# Patient Record
Sex: MALE | Race: WHITE | NOT HISPANIC OR LATINO | Employment: OTHER | URBAN - METROPOLITAN AREA
[De-identification: names, ages, dates, MRNs, and addresses within clinical notes are randomized per-mention and may not be internally consistent; named-entity substitution may affect disease eponyms.]

---

## 2017-01-04 ENCOUNTER — HOSPITAL ENCOUNTER (OUTPATIENT)
Dept: CT IMAGING | Facility: CLINIC | Age: 57
Discharge: HOME | End: 2017-01-04
Attending: NEUROLOGICAL SURGERY | Admitting: NEUROLOGICAL SURGERY
Payer: MEDICARE

## 2017-01-04 DIAGNOSIS — G91.9 HYDROCEPHALUS (H): ICD-10-CM

## 2017-01-04 PROCEDURE — 70450 CT HEAD/BRAIN W/O DYE: CPT

## 2017-01-30 ENCOUNTER — OFFICE VISIT (OUTPATIENT)
Dept: NEUROSURGERY | Facility: CLINIC | Age: 57
End: 2017-01-30

## 2017-01-30 ENCOUNTER — OFFICE VISIT (OUTPATIENT)
Dept: NEUROLOGY | Facility: CLINIC | Age: 57
End: 2017-01-30

## 2017-01-30 VITALS
DIASTOLIC BLOOD PRESSURE: 67 MMHG | WEIGHT: 191 LBS | BODY MASS INDEX: 29.98 KG/M2 | SYSTOLIC BLOOD PRESSURE: 110 MMHG | HEART RATE: 72 BPM | HEIGHT: 67 IN

## 2017-01-30 VITALS — HEART RATE: 59 BPM | DIASTOLIC BLOOD PRESSURE: 62 MMHG | SYSTOLIC BLOOD PRESSURE: 118 MMHG | HEIGHT: 67 IN

## 2017-01-30 DIAGNOSIS — G40.909 SEIZURE DISORDER (H): Primary | ICD-10-CM

## 2017-01-30 DIAGNOSIS — G91.9 HYDROCEPHALUS, ACQUIRED (H): Primary | ICD-10-CM

## 2017-01-30 RX ORDER — ZONISAMIDE 100 MG/1
300 CAPSULE ORAL AT BEDTIME
Qty: 90 CAPSULE | Refills: 11 | Status: SHIPPED | OUTPATIENT
Start: 2017-01-30 | End: 2017-05-10

## 2017-01-30 RX ORDER — SERTRALINE HYDROCHLORIDE 100 MG/1
200 TABLET, FILM COATED ORAL
COMMUNITY
Start: 2016-11-15

## 2017-01-30 ASSESSMENT — PAIN SCALES - GENERAL: PAINLEVEL: NO PAIN (0)

## 2017-01-30 NOTE — Clinical Note
2017       RE: Garcia Canada  : 1960   MRN: [id number]      Dear Colleague,    Thank you for referring your patient, Garcia Canada, to the [facility] EPILEPSY CARE at [facility]. Please see a copy of my visit note below.    CHIEF COMPLAINT: Seizures.    HISTORY OF PRESENT ILLNESS: This patient is a 56-year-old right-handed male returns for follow up for seizures.   He had a history of right-sided meningioma, status post resection in 2014. He had an intracerebral hemorrhage over the left side of the brain.  He was previously seen by Dr. Michael Rodriguez at [facility] for his seizures.  The patient is accompanied by his friend Maribeth in the clinic today.  Patient continue to have seizures since the last visit.  His Lamictal was increaed to 300 - 400 since the last visit. His friend stated that he is having seizures once every 5-6 weeks.  Since the last visit, he had probably 2 seizures.  These seizures were described as tonic seizures, with probably LOC.  The last 2 seizures lasted for 20 sec. Each.    He was stried on Depakote recently due to increased seizure frequency.  Unfortunately, he experienced significant side effects from Depakote.  He was having significant hallucinations for the past two weeks, and Depakote had to be discontinued.    He had Botox injection for his right sided spasticity.  His spasticity improved.    According to the patient, he had a history of right-sided meningioma, status post resection in 2014. He had an intracerebral hemorrhage over the left side of the brain. He underwent a craniotomy for intraparenchymal hematoma on 2014. He had one seizure at the time of the hemorrhage. The description of the seizure is unclear.   In 2014, he had a repeat CT scan which showed dilatation of the left temporal horn related to entrapment. On 2014, Dr. Sudarshan Plummer performed an endoscopic fenestration of the cyst, and he was found to  have seizures in the hospital. The patient reported that he had a total of four seizures since 11/2014. Three were generalized tonic-clonic seizures. One was probably simple partial seizure. The simple partial seizure was right arm tonic posturing with no loss of consciousness. He remembered those and the generalized tonic-clonic seizures started with right hand tonic posturing and then progressed to generalized tonic-clonic movement. He reported that two of the seizures were witnessed. One was witnessed in the hospital and one was witnessed by his sister.     TRIGGERS FOR SEIZURES: Unclear except missing medications.   RISK FACTORS FOR SEIZURES: Left intraparenchymal hemorrhage of the brain in 01/2014. He had no history of head trauma with loss of consciousness. No history of CNS infection. No history of febrile convulsions.   PAST MEDICAL HISTORY: Cerebral intraparenchymal hemorrhage with hematoma, aneurysm basilar tip, right spastic hemiparesis, hypertension, seizures.   PAST SURGICAL HISTORY: Craniotomy with evacuation of hematoma subdural endoscopy, optical tracking system, ventriculostomy.     Current Outpatient Prescriptions   Medication Sig Dispense Refill     sertraline (ZOLOFT) 100 MG tablet Take 100 mg by mouth       zonisamide (ZONEGRAN) 100 MG capsule Take 3 capsules (300 mg) by mouth At Bedtime 90 capsule 11     lamoTRIgine (LAMICTAL) 100 MG tablet Take 3 tabs (300 mg) in the morning and 4 tabs in the evening. 630 tablet 2     AmLODIPine Besylate (NORVASC PO) Take 5 mg by mouth        carvedilol (COREG) 6.25 MG tablet Take 1 tablet (6.25 mg) by mouth 2 times daily 60 tablet 0     acetaminophen (TYLENOL) 325 MG tablet Take 2 tablets (650 mg) by mouth every 6 hours as needed for mild pain       atorvastatin (LIPITOR) 10 MG tablet Take 1 tablet (10 mg) by mouth daily          ALLERGIES: No known drug allergy.   FAMILY HISTORY: No family history of seizures.   SOCIAL HISTORY: He is single, living by  "himself. Smokes one pack every five days cigarettes. No alcohol use. No drug abuse.   REVIEW OF SYSTEMS: Right-sided weakness and spasticity. Anxiety and seizures. The rest of the 10-point review of systems is negative.     PHYSICAL EXAMINATION:   Blood pressure 110/67, pulse 72, height 5' 7.01\" (170.2 cm), weight 191 lb (86.637 kg).    HEENT: Normocephalic, atraumatic.   NECK: Supple.   EXTREMITIES: No edema.     NEUROLOGIC EXAM: Alert and oriented x3. Speech fluent, appropriate.   CRANIAL NERVES: Pupils are equal, round, reactive to light and accommodation. Extraocular movement intact. No facial weakness.  MOTOR EXAM: Normal bulk, increased tone in right upper and right lower extremities. Strength is 4/5 over the right upper and lower extremities and 5/5 over the left upper and lower extremities.   COORDINATION: Finger-to-nose, heel-to-shin examinations were normal on the left side. The patient was not able to perform over the right side.   SENSORY: Decreased sensation over the right upper and lower extremities.   GAIT: Spastic hemiparetic gait with the assistance of a cane.     PREVIOUS DIAGNOSTIC TESTING: CT of the head on 04/17/2015 showed slight interval decrease in dilated left temporal cyst and the dilated left temporal horn since the prior exam, previous left-sided craniotomy and the white matter changes in the left hemisphere again noted. MRI scan on 10/07/2014 showed trapped left lateral ventricle with cystic dilatation and adjacent vasogenic edema causing mass effect, unchanged. MRI scan on 01/02/2014 showed increased size of a hyperacute to acute intraparenchymal hemorrhage in the left posterior frontoparietal region with hemorrhage extending into an adjacent area of preexisting encephalomalacia as well as the left lateral ventricle. There is new blood extending into the right lateral ventricle occipital horn and possibly the fourth ventricle. Acute subarachnoid hemorrhage is again seen over the left " parietal and the temporal lobe and insula with possible new subarachnoid blood over the cerebellar folia. There are two regions of enhancement along the anteromedial and anterolateral hemorrhage cavity which may represent sites of residual or recurrent tumor. Comparison with prior image would likely be helpful.   EEG on of 12/01/2014 showed abnormal. There is evidence of focal cerebral dysfunction over the left hemisphere, especially the left posterior hemisphere. This is consistent with focal cerebral dysfunction in this region.   EEG on 01/03/2014 showed abnormal EEG due to the presence of mild diffuse slowing consistent with mild diffuse encephalopathy as well as additional severe slowing over the left temporal parietal head region consistent with the patient's history of subdural hematoma in this region.     Lamictal level (3/9/2016): 8.7    IMPRESSION:   1. Seizures. This patient is a 56-year-old right-handed male returns for follow up for seizures.   He had a history of right-sided meningioma, status post resection in 01/2014. He had an intracerebral hemorrhage over the left side of the brain.  He was previously seen by Dr. Michael Rodriguez at [facility] for his seizures.  The patient is accompanied by his friend Maribeth in the clinic today.  Patient continue to have seizures since the last visit.  His Lamictal was increaed to 300 - 400 since the last visit. His friend stated that he is having seizures once every 5-6 weeks.  Since the last visit, he had probably 2 seizures.  These seizures were described as tonic seizures, with probably LOC.  The last 2 seizures lasted for 20 sec. Each.    2. History of intracerebral hemorrhage status post evacuation of hematoma.   3. Status post endoscopic fenestration of the left temporal horn cyst related to entrapment.   4. Hypertension.   5. Anxiety.   6. Right spastic hemiparesis.  Improved with Botox.    PLAN:   1. Continue Lamictal 300-400 mg.  2. Start Zonegran 100mg  qhs for one week, then 200mg qhs for one week, then 300mg qhs.  3. Return to clinic in three months.  If patient continue to have seizures, options in the future are a. Increase Lamictal or Zonegran more, b. Add Vimpat.        25 min was spent on the visit.  Over 50% of the time was spent on education, counseling about optimal seizure control and coordination of care.        Again, thank you for allowing me to participate in the care of your patient.      Sincerely,    Rishi Hirsch MD

## 2017-01-30 NOTE — PROGRESS NOTES
CHIEF COMPLAINT: Seizures.    HISTORY OF PRESENT ILLNESS: This patient is a 56-year-old right-handed male returns for follow up for seizures.   He had a history of right-sided meningioma, status post resection in 01/2014. He had an intracerebral hemorrhage over the left side of the brain.  He was previously seen by Dr. Michael Rodriguez at [facility] for his seizures.  The patient is accompanied by his friend Maribeth in the clinic today.  Patient continue to have seizures since the last visit.  His Lamictal was increaed to 300 - 400 since the last visit. His friend stated that he is having seizures once every 5-6 weeks.  Since the last visit, he had probably 2 seizures.  These seizures were described as tonic seizures, with probably LOC.  The last 2 seizures lasted for 20 sec. Each.    He was stried on Depakote recently due to increased seizure frequency.  Unfortunately, he experienced significant side effects from Depakote.  He was having significant hallucinations for the past two weeks, and Depakote had to be discontinued.    He had Botox injection for his right sided spasticity.  His spasticity improved.    According to the patient, he had a history of right-sided meningioma, status post resection in 01/2014. He had an intracerebral hemorrhage over the left side of the brain. He underwent a craniotomy for intraparenchymal hematoma on 01/02/2014. He had one seizure at the time of the hemorrhage. The description of the seizure is unclear.   In 11/2014, he had a repeat CT scan which showed dilatation of the left temporal horn related to entrapment. On 11/12/2014, Dr. Sudarshan Plummer performed an endoscopic fenestration of the cyst, and he was found to have seizures in the hospital. The patient reported that he had a total of four seizures since 11/2014. Three were generalized tonic-clonic seizures. One was probably simple partial seizure. The simple partial seizure was right arm tonic posturing with no loss of  consciousness. He remembered those and the generalized tonic-clonic seizures started with right hand tonic posturing and then progressed to generalized tonic-clonic movement. He reported that two of the seizures were witnessed. One was witnessed in the hospital and one was witnessed by his sister.     TRIGGERS FOR SEIZURES: Unclear except missing medications.   RISK FACTORS FOR SEIZURES: Left intraparenchymal hemorrhage of the brain in 01/2014. He had no history of head trauma with loss of consciousness. No history of CNS infection. No history of febrile convulsions.   PAST MEDICAL HISTORY: Cerebral intraparenchymal hemorrhage with hematoma, aneurysm basilar tip, right spastic hemiparesis, hypertension, seizures.   PAST SURGICAL HISTORY: Craniotomy with evacuation of hematoma subdural endoscopy, optical tracking system, ventriculostomy.     Current Outpatient Prescriptions   Medication Sig Dispense Refill     sertraline (ZOLOFT) 100 MG tablet Take 100 mg by mouth       zonisamide (ZONEGRAN) 100 MG capsule Take 3 capsules (300 mg) by mouth At Bedtime 90 capsule 11     lamoTRIgine (LAMICTAL) 100 MG tablet Take 3 tabs (300 mg) in the morning and 4 tabs in the evening. 630 tablet 2     AmLODIPine Besylate (NORVASC PO) Take 5 mg by mouth        carvedilol (COREG) 6.25 MG tablet Take 1 tablet (6.25 mg) by mouth 2 times daily 60 tablet 0     acetaminophen (TYLENOL) 325 MG tablet Take 2 tablets (650 mg) by mouth every 6 hours as needed for mild pain       atorvastatin (LIPITOR) 10 MG tablet Take 1 tablet (10 mg) by mouth daily          ALLERGIES: No known drug allergy.   FAMILY HISTORY: No family history of seizures.   SOCIAL HISTORY: He is single, living by himself. Smokes one pack every five days cigarettes. No alcohol use. No drug abuse.   REVIEW OF SYSTEMS: Right-sided weakness and spasticity. Anxiety and seizures. The rest of the 10-point review of systems is negative.     PHYSICAL EXAMINATION:   Blood pressure 110/67,  "pulse 72, height 5' 7.01\" (170.2 cm), weight 191 lb (86.637 kg).    HEENT: Normocephalic, atraumatic.   NECK: Supple.   EXTREMITIES: No edema.     NEUROLOGIC EXAM: Alert and oriented x3. Speech fluent, appropriate.   CRANIAL NERVES: Pupils are equal, round, reactive to light and accommodation. Extraocular movement intact. No facial weakness.  MOTOR EXAM: Normal bulk, increased tone in right upper and right lower extremities. Strength is 4/5 over the right upper and lower extremities and 5/5 over the left upper and lower extremities.   COORDINATION: Finger-to-nose, heel-to-shin examinations were normal on the left side. The patient was not able to perform over the right side.   SENSORY: Decreased sensation over the right upper and lower extremities.   GAIT: Spastic hemiparetic gait with the assistance of a cane.     PREVIOUS DIAGNOSTIC TESTING: CT of the head on 04/17/2015 showed slight interval decrease in dilated left temporal cyst and the dilated left temporal horn since the prior exam, previous left-sided craniotomy and the white matter changes in the left hemisphere again noted. MRI scan on 10/07/2014 showed trapped left lateral ventricle with cystic dilatation and adjacent vasogenic edema causing mass effect, unchanged. MRI scan on 01/02/2014 showed increased size of a hyperacute to acute intraparenchymal hemorrhage in the left posterior frontoparietal region with hemorrhage extending into an adjacent area of preexisting encephalomalacia as well as the left lateral ventricle. There is new blood extending into the right lateral ventricle occipital horn and possibly the fourth ventricle. Acute subarachnoid hemorrhage is again seen over the left parietal and the temporal lobe and insula with possible new subarachnoid blood over the cerebellar folia. There are two regions of enhancement along the anteromedial and anterolateral hemorrhage cavity which may represent sites of residual or recurrent tumor. Comparison " with prior image would likely be helpful.   EEG on of 12/01/2014 showed abnormal. There is evidence of focal cerebral dysfunction over the left hemisphere, especially the left posterior hemisphere. This is consistent with focal cerebral dysfunction in this region.   EEG on 01/03/2014 showed abnormal EEG due to the presence of mild diffuse slowing consistent with mild diffuse encephalopathy as well as additional severe slowing over the left temporal parietal head region consistent with the patient's history of subdural hematoma in this region.     Lamictal level (3/9/2016): 8.7    IMPRESSION:   1. Seizures. This patient is a 56-year-old right-handed male returns for follow up for seizures.   He had a history of right-sided meningioma, status post resection in 01/2014. He had an intracerebral hemorrhage over the left side of the brain.  He was previously seen by Dr. Michael Rodriguez at [facility] for his seizures.  The patient is accompanied by his friend Maribeth in the clinic today.  Patient continue to have seizures since the last visit.  His Lamictal was increaed to 300 - 400 since the last visit. His friend stated that he is having seizures once every 5-6 weeks.  Since the last visit, he had probably 2 seizures.  These seizures were described as tonic seizures, with probably LOC.  The last 2 seizures lasted for 20 sec. Each.    2. History of intracerebral hemorrhage status post evacuation of hematoma.   3. Status post endoscopic fenestration of the left temporal horn cyst related to entrapment.   4. Hypertension.   5. Anxiety.   6. Right spastic hemiparesis.  Improved with Botox.    PLAN:   1. Continue Lamictal 300-400 mg.  2. Start Zonegran 100mg qhs for one week, then 200mg qhs for one week, then 300mg qhs.  3. Return to clinic in three months.  If patient continue to have seizures, options in the future are a. Increase Lamictal or Zonegran more, b. Add Vimpat.        25 min was spent on the visit.  Over 50% of  the time was spent on education, counseling about optimal seizure control and coordination of care.

## 2017-01-30 NOTE — Clinical Note
"1/30/2017       RE: Garcia Canada  [address]  [place] [id number]     Dear Colleague,    Thank you for referring your patient, Garcia Canada, to the [facility] NEUROSURGERY at [facility]. Please see a copy of my visit note below.     It was a pleasure to see Mr. Canada today in Neurosurgery Clinic.  He is a 54-year-old male who recently underwent an endoscopic fenestration of a trapped left temporal horn on 11/12/2014.  He returns today for routine followup.  He is doing well overall. There are some mild concerns about his word finding and memory.  Dr. Hirsch treats him for seizures. He was last seen one year ago.    Filed Vitals:    01/30/17 1103   BP: 118/62   Pulse: 59   Height: 1.702 m (5' 7\")     There is no weight on file to calculate BMI.  No Pain (0)    Awake, alert.    R hemiparesis. Stable.    CT stable from last year.     A: Arachnoid cyst s/p fenestration.    P: At this point I have recommended PRN follow up. I have explained signs and symptoms to watch for.  Sudarshan Plummer MD    "

## 2017-02-06 NOTE — PROGRESS NOTES
" It was a pleasure to see Mr. Canada today in Neurosurgery Clinic.  He is a 54-year-old male who recently underwent an endoscopic fenestration of a trapped left temporal horn on 11/12/2014.  He returns today for routine followup.  He is doing well overall. There are some mild concerns about his word finding and memory.  Dr. Hirsch treats him for seizures. He was last seen one year ago.    Filed Vitals:    01/30/17 1103   BP: 118/62   Pulse: 59   Height: 1.702 m (5' 7\")     There is no weight on file to calculate BMI.  No Pain (0)    Awake, alert.    R hemiparesis. Stable.    CT stable from last year.     A: Arachnoid cyst s/p fenestration.    P: At this point I have recommended PRN follow up. I have explained signs and symptoms to watch for.  "

## 2017-02-14 ENCOUNTER — TELEPHONE (OUTPATIENT)
Dept: NEUROLOGY | Facility: CLINIC | Age: 57
End: 2017-02-14

## 2017-02-14 NOTE — TELEPHONE ENCOUNTER
Writer received phone call from patient's POA indicating that patient was feeling ill while on Zonegran titration.  He is currently increasing it at 100 mg per week.  He has had no recent seizures.    POA is asking if he needs to continue increasing or if he can wait another week  and increase it then.      Writer advised waiting a week longer and increasing at that time.  She is agreeable with this plan and has no other questions at this time.    Jacob Valero

## 2017-04-03 ENCOUNTER — OFFICE VISIT (OUTPATIENT)
Dept: NEUROLOGY | Facility: CLINIC | Age: 57
End: 2017-04-03

## 2017-04-03 VITALS
WEIGHT: 195.6 LBS | HEIGHT: 67 IN | BODY MASS INDEX: 30.7 KG/M2 | SYSTOLIC BLOOD PRESSURE: 108 MMHG | HEART RATE: 69 BPM | DIASTOLIC BLOOD PRESSURE: 59 MMHG

## 2017-04-03 DIAGNOSIS — G40.909 SEIZURE DISORDER (H): Primary | ICD-10-CM

## 2017-04-03 NOTE — PATIENT INSTRUCTIONS
1. Continue Lamictal 300-400 mg and Zonegran 300mg qhs.  2. Check Lamictal and Zonegran levels.  3. Return to clinic in three months. If patient continue to have seizures, options in the future are a. Increase Lamictal or Zonegran more, b. Add Vimpat.

## 2017-04-03 NOTE — LETTER
4/3/2017       RE: Garcia Canada  : 1960   MRN: [id number]      Dear Colleague,    Thank you for referring your patient, Garcia Canada, to the [facility] at [facility]. Please see a copy of my visit note below.    CHIEF COMPLAINT: Seizures.    HISTORY OF PRESENT ILLNESS: This patient is a 56-year-old right-handed male returns for follow up for seizures.   He had a history of right-sided meningioma, status post resection in 2014. He had an intracerebral hemorrhage over the left side of the brain.  He was previously seen by Dr. Michael Rodriguez at [facility] for his seizures.  The patient is accompanied by his friend Maribeth in the clinic today.  Since the last visit, he had no seizures until last week.  He had one seizure after he missed one dose the night before.  He was also sleep deprived.  He had one tonic seizure/CPS with LOC.  He added Zonegran since the last visit.  He is now on Lamictal 300 - 400 and Zonegran 300 qhs.  Hie was having seizures once every 5-6 weeks before starting the Zonegran.  He had no seizures for 3 and half months after started on Zonegran.  These seizures were described as tonic seizures, with probably LOC.  The last 2 seizures lasted for 20 sec each.    He was stried on Depakote recently due to increased seizure frequency.  Unfortunately, he experienced significant side effects from Depakote.  He was having significant hallucinations for the past two weeks, and Depakote had to be discontinued.    He had Botox injection for his right sided spasticity.  His spasticity improved.    According to the patient, he had a history of right-sided meningioma, status post resection in 2014. He had an intracerebral hemorrhage over the left side of the brain. He underwent a craniotomy for intraparenchymal hematoma on 2014. He had one seizure at the time of the hemorrhage. The description of the seizure is unclear.   In 2014, he  had a repeat CT scan which showed dilatation of the left temporal horn related to entrapment. On 11/12/2014, Dr. Sudarshan Plummer performed an endoscopic fenestration of the cyst, and he was found to have seizures in the hospital. The patient reported that he had a total of four seizures since 11/2014. Three were generalized tonic-clonic seizures. One was probably simple partial seizure. The simple partial seizure was right arm tonic posturing with no loss of consciousness. He remembered those and the generalized tonic-clonic seizures started with right hand tonic posturing and then progressed to generalized tonic-clonic movement. He reported that two of the seizures were witnessed. One was witnessed in the hospital and one was witnessed by his sister.     TRIGGERS FOR SEIZURES: Unclear except missing medications.   RISK FACTORS FOR SEIZURES: Left intraparenchymal hemorrhage of the brain in 01/2014. He had no history of head trauma with loss of consciousness. No history of CNS infection. No history of febrile convulsions.   PAST MEDICAL HISTORY: Cerebral intraparenchymal hemorrhage with hematoma, aneurysm basilar tip, right spastic hemiparesis, hypertension, seizures.   PAST SURGICAL HISTORY: Craniotomy with evacuation of hematoma subdural endoscopy, optical tracking system, ventriculostomy.     Current Outpatient Prescriptions   Medication Sig Dispense Refill     sertraline (ZOLOFT) 100 MG tablet Take 100 mg by mouth       zonisamide (ZONEGRAN) 100 MG capsule Take 3 capsules (300 mg) by mouth At Bedtime 90 capsule 11     lamoTRIgine (LAMICTAL) 100 MG tablet Take 3 tabs (300 mg) in the morning and 4 tabs in the evening. 630 tablet 2     AmLODIPine Besylate (NORVASC PO) Take 5 mg by mouth        carvedilol (COREG) 6.25 MG tablet Take 1 tablet (6.25 mg) by mouth 2 times daily 60 tablet 0     acetaminophen (TYLENOL) 325 MG tablet Take 2 tablets (650 mg) by mouth every 6 hours as needed for mild pain       atorvastatin  (LIPITOR) 10 MG tablet Take 1 tablet (10 mg) by mouth daily          ALLERGIES: No known drug allergy.   FAMILY HISTORY: No family history of seizures.   SOCIAL HISTORY: He is single, living by himself. Smokes one pack every five days cigarettes. No alcohol use. No drug abuse.   REVIEW OF SYSTEMS: Right-sided weakness and spasticity. Anxiety and seizures. The rest of the 10-point review of systems is negative.     PHYSICAL EXAMINATION:   There were no vitals taken for this visit.    HEENT: Normocephalic, atraumatic.   NECK: Supple.   EXTREMITIES: No edema.     NEUROLOGIC EXAM: Alert and oriented x3. Speech fluent, appropriate.   CRANIAL NERVES: Pupils are equal, round, reactive to light and accommodation. Extraocular movement intact. No facial weakness.  MOTOR EXAM: Normal bulk, increased tone in right upper and right lower extremities. Strength is 4/5 over the right upper and lower extremities and 5/5 over the left upper and lower extremities.   COORDINATION: Finger-to-nose, heel-to-shin examinations were normal on the left side. The patient was not able to perform over the right side.   SENSORY: Decreased sensation over the right upper and lower extremities.   GAIT: Spastic hemiparetic gait with the assistance of a cane.     PREVIOUS DIAGNOSTIC TESTING: CT of the head on 04/17/2015 showed slight interval decrease in dilated left temporal cyst and the dilated left temporal horn since the prior exam, previous left-sided craniotomy and the white matter changes in the left hemisphere again noted. MRI scan on 10/07/2014 showed trapped left lateral ventricle with cystic dilatation and adjacent vasogenic edema causing mass effect, unchanged. MRI scan on 01/02/2014 showed increased size of a hyperacute to acute intraparenchymal hemorrhage in the left posterior frontoparietal region with hemorrhage extending into an adjacent area of preexisting encephalomalacia as well as the left lateral ventricle. There is new blood  extending into the right lateral ventricle occipital horn and possibly the fourth ventricle. Acute subarachnoid hemorrhage is again seen over the left parietal and the temporal lobe and insula with possible new subarachnoid blood over the cerebellar folia. There are two regions of enhancement along the anteromedial and anterolateral hemorrhage cavity which may represent sites of residual or recurrent tumor. Comparison with prior image would likely be helpful.   EEG on of 12/01/2014 showed abnormal. There is evidence of focal cerebral dysfunction over the left hemisphere, especially the left posterior hemisphere. This is consistent with focal cerebral dysfunction in this region.   EEG on 01/03/2014 showed abnormal EEG due to the presence of mild diffuse slowing consistent with mild diffuse encephalopathy as well as additional severe slowing over the left temporal parietal head region consistent with the patient's history of subdural hematoma in this region.     Lamictal level (3/9/2016): 8.7    IMPRESSION:   1. Seizures. This patient is a 56-year-old right-handed male returns for follow up for seizures.   He had a history of right-sided meningioma, status post resection in 01/2014. He had an intracerebral hemorrhage over the left side of the brain.  He was previously seen by Dr. Michael Rodriguez at [facility] for his seizures.  The patient is accompanied by his friend Maribeth in the clinic today.  Since the last visit, he had no seizures until last week.  He had one seizure after he missed one dose the night before.  He was also sleep deprived.  He had one tonic seizure/CPS with LOC.  He added Zonegran since the last visit.  He is now on Lamictal 300 - 400 and Zonegran 300 qhs.  Hie was having seizures once every 5-6 weeks before starting the Zonegran.  He had no seizures for 3 and half months after started on Zonegran.  These seizures were described as tonic seizures, with probably LOC.  The last 2 seizures lasted  for 20 sec each.    2. History of intracerebral hemorrhage status post evacuation of hematoma.   3. Status post endoscopic fenestration of the left temporal horn cyst related to entrapment.   4. Hypertension.   5. Anxiety.   6. Right spastic hemiparesis.  Improved with Botox.    PLAN:   1. Continue Lamictal 300-400 mg and Zonegran 300mg qhs.  2. Check Lamictal and Zonegran levels.  3. Return to clinic in three months.  If patient continue to have seizures, options in the future are a. Increase Lamictal or Zonegran more, b. Add Vimpat.    30 min was spent on the visit.  Over 50% of the time was spent on education, counseling about optimal seizure control and coordination of care.      Again, thank you for allowing me to participate in the care of your patient.      Sincerely,    Rishi Hirsch MD

## 2017-04-03 NOTE — MR AVS SNAPSHOT
After Visit Summary   4/3/2017    Garcia Canada    MRN: [id number]           Patient Information     Date Of Birth          1960        Visit Information        Provider Department      4/3/2017 11:00 AM Rishi Hirsch MD [facility]         Follow-ups after your visit        Your next 10 appointments already scheduled     Apr 03, 2017 11:00 AM CDT   Return Visit with MD [facility]    [address], [address]  [facility] [id number] [phone]              Who to contact     Please call your clinic at [phone] to:    Ask questions about your health    Make or cancel appointments    Discuss your medicines    Learn about your test results    Speak to your doctor   If you have compliments or concerns about an experience at your clinic, or if you wish to file a complaint, please contact [facility] Patient Relations at [phone] or email us at [email or web address]         Additional Information About Your Visit        MyChart Information     [employer] gives you secure access to your electronic health record. If you see a primary care provider, you can also send messages to your care team and make appointments. If you have questions, please call your primary care clinic.  If you do not have a primary care provider, please call [phone] and they will assist you.      [employer]it is an electronic gateway that provides easy, online access to your medical records. With [employer], you can request a clinic appointment, read your test results, renew a prescription or communicate with your care team.     To access your existing account, please contact your [facility] or call [phone] for assistance.        Care EveryWhere ID     This is your Care EveryWhere ID. This could be used by other organizations to access your [facility] medical records  [id number]          Blood Pressure from Last 3 Encounters:   01/30/17 110/67   01/30/17 118/62   10/17/16 121/65    Weight from Last 3 Encounters:   01/30/17 191 lb (86.6 kg)   10/17/16 191 lb (86.6 kg)   06/06/16 187 lb 3.2 oz (84.9 kg)              Today, you had the following     No orders found for display       Primary Care Provider Office Phone # Fax #    Steven Duane Semmler, [phone]       [facility] [address] [facility] [address]        Thank you!     Thank you for choosing [facility]  for your care. Our goal is always to provide you with excellent care. Hearing back from our patients is one way we can continue to improve our services. Please take a few minutes to complete the written survey that you may receive in the mail after your visit with us. Thank you!             Your Updated Medication List - Protect others around you: Learn how to safely use, store and throw away your medicines at www.disposemymeds.org.          This list is accurate as of: 4/3/17 10:14 AM.  Always use your most recent med list.                   Brand Name Dispense Instructions for use    acetaminophen 325 MG tablet    TYLENOL     Take 2 tablets (650 mg) by mouth every 6 hours as needed for mild pain       atorvastatin 10 MG tablet    LIPITOR     Take 1 tablet (10 mg) by mouth daily       carvedilol 6.25 MG tablet    COREG    60 tablet    Take 1 tablet (6.25 mg) by mouth 2 times daily       lamoTRIgine 100 MG tablet    LaMICtal    630 tablet    Take 3 tabs (300 mg) in the morning and 4 tabs in the evening.       NORVASC PO      Take 5 mg by mouth       sertraline 100 MG tablet    ZOLOFT     Take 100 mg by mouth       zonisamide 100 MG capsule    ZONEGRAN    90 capsule    Take 3 capsules (300 mg) by mouth At Bedtime

## 2017-04-03 NOTE — PROGRESS NOTES
CHIEF COMPLAINT: Seizures.    HISTORY OF PRESENT ILLNESS: This patient is a 56-year-old right-handed male returns for follow up for seizures.   He had a history of right-sided meningioma, status post resection in 01/2014. He had an intracerebral hemorrhage over the left side of the brain.  He was previously seen by Dr. Michael Rodriguez at [facility] for his seizures.  The patient is accompanied by his friend Maribeth in the clinic today.  Since the last visit, he had no seizures until last week.  He had one seizure after he missed one dose the night before.  He was also sleep deprived.  He had one tonic seizure/CPS with LOC.  He added Zonegran since the last visit.  He is now on Lamictal 300 - 400 and Zonegran 300 qhs.  Hie was having seizures once every 5-6 weeks before starting the Zonegran.  He had no seizures for 3 and half months after started on Zonegran.  These seizures were described as tonic seizures, with probably LOC.  The last 2 seizures lasted for 20 sec each.    He was stried on Depakote recently due to increased seizure frequency.  Unfortunately, he experienced significant side effects from Depakote.  He was having significant hallucinations for the past two weeks, and Depakote had to be discontinued.    He had Botox injection for his right sided spasticity.  His spasticity improved.    According to the patient, he had a history of right-sided meningioma, status post resection in 01/2014. He had an intracerebral hemorrhage over the left side of the brain. He underwent a craniotomy for intraparenchymal hematoma on 01/02/2014. He had one seizure at the time of the hemorrhage. The description of the seizure is unclear.   In 11/2014, he had a repeat CT scan which showed dilatation of the left temporal horn related to entrapment. On 11/12/2014, Dr. Sudarshan Plummer performed an endoscopic fenestration of the cyst, and he was found to have seizures in the hospital. The patient reported that he had a total of  four seizures since 11/2014. Three were generalized tonic-clonic seizures. One was probably simple partial seizure. The simple partial seizure was right arm tonic posturing with no loss of consciousness. He remembered those and the generalized tonic-clonic seizures started with right hand tonic posturing and then progressed to generalized tonic-clonic movement. He reported that two of the seizures were witnessed. One was witnessed in the hospital and one was witnessed by his sister.     TRIGGERS FOR SEIZURES: Unclear except missing medications.   RISK FACTORS FOR SEIZURES: Left intraparenchymal hemorrhage of the brain in 01/2014. He had no history of head trauma with loss of consciousness. No history of CNS infection. No history of febrile convulsions.   PAST MEDICAL HISTORY: Cerebral intraparenchymal hemorrhage with hematoma, aneurysm basilar tip, right spastic hemiparesis, hypertension, seizures.   PAST SURGICAL HISTORY: Craniotomy with evacuation of hematoma subdural endoscopy, optical tracking system, ventriculostomy.     Current Outpatient Prescriptions   Medication Sig Dispense Refill     sertraline (ZOLOFT) 100 MG tablet Take 100 mg by mouth       zonisamide (ZONEGRAN) 100 MG capsule Take 3 capsules (300 mg) by mouth At Bedtime 90 capsule 11     lamoTRIgine (LAMICTAL) 100 MG tablet Take 3 tabs (300 mg) in the morning and 4 tabs in the evening. 630 tablet 2     AmLODIPine Besylate (NORVASC PO) Take 5 mg by mouth        carvedilol (COREG) 6.25 MG tablet Take 1 tablet (6.25 mg) by mouth 2 times daily 60 tablet 0     acetaminophen (TYLENOL) 325 MG tablet Take 2 tablets (650 mg) by mouth every 6 hours as needed for mild pain       atorvastatin (LIPITOR) 10 MG tablet Take 1 tablet (10 mg) by mouth daily          ALLERGIES: No known drug allergy.   FAMILY HISTORY: No family history of seizures.   SOCIAL HISTORY: He is single, living by himself. Smokes one pack every five days cigarettes. No alcohol use. No drug  abuse.   REVIEW OF SYSTEMS: Right-sided weakness and spasticity. Anxiety and seizures. The rest of the 10-point review of systems is negative.     PHYSICAL EXAMINATION:   There were no vitals taken for this visit.    HEENT: Normocephalic, atraumatic.   NECK: Supple.   EXTREMITIES: No edema.     NEUROLOGIC EXAM: Alert and oriented x3. Speech fluent, appropriate.   CRANIAL NERVES: Pupils are equal, round, reactive to light and accommodation. Extraocular movement intact. No facial weakness.  MOTOR EXAM: Normal bulk, increased tone in right upper and right lower extremities. Strength is 4/5 over the right upper and lower extremities and 5/5 over the left upper and lower extremities.   COORDINATION: Finger-to-nose, heel-to-shin examinations were normal on the left side. The patient was not able to perform over the right side.   SENSORY: Decreased sensation over the right upper and lower extremities.   GAIT: Spastic hemiparetic gait with the assistance of a cane.     PREVIOUS DIAGNOSTIC TESTING: CT of the head on 04/17/2015 showed slight interval decrease in dilated left temporal cyst and the dilated left temporal horn since the prior exam, previous left-sided craniotomy and the white matter changes in the left hemisphere again noted. MRI scan on 10/07/2014 showed trapped left lateral ventricle with cystic dilatation and adjacent vasogenic edema causing mass effect, unchanged. MRI scan on 01/02/2014 showed increased size of a hyperacute to acute intraparenchymal hemorrhage in the left posterior frontoparietal region with hemorrhage extending into an adjacent area of preexisting encephalomalacia as well as the left lateral ventricle. There is new blood extending into the right lateral ventricle occipital horn and possibly the fourth ventricle. Acute subarachnoid hemorrhage is again seen over the left parietal and the temporal lobe and insula with possible new subarachnoid blood over the cerebellar folia. There are two  regions of enhancement along the anteromedial and anterolateral hemorrhage cavity which may represent sites of residual or recurrent tumor. Comparison with prior image would likely be helpful.   EEG on of 12/01/2014 showed abnormal. There is evidence of focal cerebral dysfunction over the left hemisphere, especially the left posterior hemisphere. This is consistent with focal cerebral dysfunction in this region.   EEG on 01/03/2014 showed abnormal EEG due to the presence of mild diffuse slowing consistent with mild diffuse encephalopathy as well as additional severe slowing over the left temporal parietal head region consistent with the patient's history of subdural hematoma in this region.     Lamictal level (3/9/2016): 8.7    IMPRESSION:   1. Seizures. This patient is a 56-year-old right-handed male returns for follow up for seizures.   He had a history of right-sided meningioma, status post resection in 01/2014. He had an intracerebral hemorrhage over the left side of the brain.  He was previously seen by Dr. Michael Rodriguez at [facility] for his seizures.  The patient is accompanied by his friend Maribeth in the clinic today.  Since the last visit, he had no seizures until last week.  He had one seizure after he missed one dose the night before.  He was also sleep deprived.  He had one tonic seizure/CPS with LOC.  He added Zonegran since the last visit.  He is now on Lamictal 300 - 400 and Zonegran 300 qhs.  Hie was having seizures once every 5-6 weeks before starting the Zonegran.  He had no seizures for 3 and half months after started on Zonegran.  These seizures were described as tonic seizures, with probably LOC.  The last 2 seizures lasted for 20 sec each.    2. History of intracerebral hemorrhage status post evacuation of hematoma.   3. Status post endoscopic fenestration of the left temporal horn cyst related to entrapment.   4. Hypertension.   5. Anxiety.   6. Right spastic hemiparesis.  Improved with  Botox.    PLAN:   1. Continue Lamictal 300-400 mg and Zonegran 300mg qhs.  2. Check Lamictal and Zonegran levels.  3. Return to clinic in three months.  If patient continue to have seizures, options in the future are a. Increase Lamictal or Zonegran more, b. Add Vimpat.    30 min was spent on the visit.  Over 50% of the time was spent on education, counseling about optimal seizure control and coordination of care.

## 2017-04-04 LAB
LAMOTRIGINE SERPL-MCNC: 12.4 UG/ML
ZONISAMIDE SERPL-MCNC: 17.3 UG/ML

## 2017-05-10 ENCOUNTER — TELEPHONE (OUTPATIENT)
Dept: NEUROLOGY | Facility: CLINIC | Age: 57
End: 2017-05-10

## 2017-05-10 DIAGNOSIS — G40.909 SEIZURE DISORDER (H): ICD-10-CM

## 2017-05-10 RX ORDER — ZONISAMIDE 100 MG/1
CAPSULE ORAL
Qty: 270 CAPSULE | Refills: 3 | Status: SHIPPED | OUTPATIENT
Start: 2017-05-10 | End: 2017-07-17

## 2017-07-17 ENCOUNTER — OFFICE VISIT (OUTPATIENT)
Dept: NEUROLOGY | Facility: CLINIC | Age: 57
End: 2017-07-17

## 2017-07-17 VITALS
HEIGHT: 67 IN | BODY MASS INDEX: 29.82 KG/M2 | SYSTOLIC BLOOD PRESSURE: 93 MMHG | DIASTOLIC BLOOD PRESSURE: 65 MMHG | HEART RATE: 55 BPM | WEIGHT: 190 LBS

## 2017-07-17 DIAGNOSIS — G40.909 SEIZURE DISORDER (H): ICD-10-CM

## 2017-07-17 DIAGNOSIS — R56.9 CONVULSIONS, UNSPECIFIED CONVULSION TYPE (H): ICD-10-CM

## 2017-07-17 RX ORDER — ZONISAMIDE 100 MG/1
CAPSULE ORAL
Qty: 270 CAPSULE | Refills: 3 | Status: SHIPPED | OUTPATIENT
Start: 2017-07-17 | End: 2018-02-19

## 2017-07-17 RX ORDER — LAMOTRIGINE 100 MG/1
TABLET ORAL
Qty: 630 TABLET | Refills: 3 | Status: SHIPPED | OUTPATIENT
Start: 2017-07-17 | End: 2018-02-19

## 2017-07-17 NOTE — PATIENT INSTRUCTIONS
Times of Days  am pm        Medication Tablet Size Number of Tablets/Capsules Total Daily Dosage   Lamictal 100  3 4        700 mg   Zonegran 100   3        300 mg                                                                                                                 Carry this with you at all times.  CONTINUE TAKING YOUR OTHER MEDICATIONS AS PREVIOUSLY DIRECTED.      * * *Do not store medications in the bathroom.  Keep medications away from children!* * *

## 2017-07-17 NOTE — MR AVS SNAPSHOT
After Visit Summary   7/17/2017    Garcia Canada    MRN: [id number]           Patient Information     Date Of Birth          1960        Visit Information        Provider Department      7/17/2017 10:30 AM Rishi Hirsch MD [facility]        Today's Diagnoses     Convulsions, unspecified convulsion type (H)        Seizure disorder (H)          Care Instructions      Times of Days  am pm        Medication Tablet Size Number of Tablets/Capsules Total Daily Dosage   Lamictal 100  3 4        700 mg   Zonegran 100   3        300 mg                                                                                                                 Carry this with you at all times.  CONTINUE TAKING YOUR OTHER MEDICATIONS AS PREVIOUSLY DIRECTED.      * * *Do not store medications in the bathroom.  Keep medications away from children!* * *             Follow-ups after your visit        Follow-up notes from your care team     Return in about 6 months (around 1/17/2018).      Your next 10 appointments already scheduled     Jan 18, 2018 10:30 AM CST   Return Visit with MD [facility] ([facility])    [address], [address]  [facility] [address] [phone]              Who to contact     Please call your clinic at [phone] to:    Ask questions about your health    Make or cancel appointments    Discuss your medicines    Learn about your test results    Speak to your doctor   If you have compliments or concerns about an experience at your clinic, or if you wish to file a complaint, please contact [facility] Patient Relations at [phone] or email us at [email or web address]         Additional Information About Your Visit        MyChart Information     [employer] gives you secure access to your electronic health record. If you see a primary care provider, you can also send messages to your care team and make  "appointments. If you have questions, please call your primary care clinic.  If you do not have a primary care provider, please call [phone] and they will assist you.      [employer] is an electronic gateway that provides easy, online access to your medical records. With [employer], you can request a clinic appointment, read your test results, renew a prescription or communicate with your care team.     To access your existing account, please contact your [facility] or call [phone] for assistance.        Care EveryWhere ID     This is your Care EveryWhere ID. This could be used by other organizations to access your [facility] medical records  [id number]        Your Vitals Were     Pulse Height BMI (Body Mass Index)             55 5' 7.01\" (170.2 cm) 29.75 kg/m2          Blood Pressure from Last 3 Encounters:   07/17/17 93/65   04/03/17 108/59   01/30/17 110/67    Weight from Last 3 Encounters:   07/17/17 190 lb (86.2 kg)   04/03/17 195 lb 9.6 oz (88.7 kg)   01/30/17 191 lb (86.6 kg)              Today, you had the following     No orders found for display         Today's Medication Changes          These changes are accurate as of: 7/17/17 11:43 AM.  If you have any questions, ask your nurse or doctor.               Stop taking these medicines if you haven't already. Please contact your care team if you have questions.     NORVASC PO   Stopped by:  Rishi Hirsch MD                Where to get your medicines      [facility] [address] [place] [address] AT [facility] OF [address] [place] [address]  [address] [address]     Phone:  [phone]     lamoTRIgine 100 MG tablet    zonisamide 100 MG capsule                Primary Care Provider Office Phone # Fax #    Steven Duane Semmler, [phone]       [facility] [address] [address]        Equal Access to Services     FIIRO " GAAR : Hadii aad jason robyn Feliz, waaxda luqadaha, qaybta kabindu naylor, sandee chrissyin hayaadavonte chinchilla amberkyle quintero . So [facility] [phone].    ATENCIÓN: Si habla stephon, tiene a wagner disposición servicios gratuitos de asistencia lingüística. Llame al [phone].    We comply with applicable federal civil rights laws and [place] laws. We do not discriminate on the basis of race, color, national origin, age, disability sex, sexual orientation or gender identity.            Thank you!     Thank you for choosing [facility]  for your care. Our goal is always to provide you with excellent care. Hearing back from our patients is one way we can continue to improve our services. Please take a few minutes to complete the written survey that you may receive in the mail after your visit with us. Thank you!             Your Updated Medication List - Protect others around you: Learn how to safely use, store and throw away your medicines at www.disposemymeds.org.          This list is accurate as of: 7/17/17 11:43 AM.  Always use your most recent med list.                   Brand Name Dispense Instructions for use Diagnosis    acetaminophen 325 MG tablet    TYLENOL     Take 2 tablets (650 mg) by mouth every 6 hours as needed for mild pain    Hydrocephalus, acquired, Intraparenchymal hemorrhage of brain (H)       atorvastatin 10 MG tablet    LIPITOR     Take 1 tablet (10 mg) by mouth daily    Hydrocephalus       carvedilol 6.25 MG tablet    COREG    60 tablet    Take 1 tablet (6.25 mg) by mouth 2 times daily    Hypertension       lamoTRIgine 100 MG tablet    LaMICtal    630 tablet    Take 3 tabs (300 mg) in the morning and 4 tabs in the evening.    Convulsions, unspecified convulsion type (H)       sertraline 100 MG tablet    ZOLOFT     Take 100 mg by mouth        zonisamide 100 MG capsule    ZONEGRAN    270 capsule    Take 3 tabs (300 mg) at hs    Seizure disorder (H)

## 2017-07-17 NOTE — PROGRESS NOTES
CHIEF COMPLAINT: Seizures.    HISTORY OF PRESENT ILLNESS: This patient is a 57-year-old right-handed male returns for follow up for seizures.   He had a history of right-sided meningioma, status post resection in 01/2014. He had an intracerebral hemorrhage over the left side of the brain.  He was previously seen by Dr. Michael Rodriguez at [facility] for his seizures.  The patient is accompanied by his friend Maribeth in the clinic today.  Since the last visit, he had no seizures.  He is now on Lamictal 300 - 400 and Zonegran 300 qhs.  Samira was having seizures once every 5-6 weeks before started on Zonegran.  It seems that he did not have any seizures since started on Zonegran as long as he did not miss any medications.  His last seizure was in March 2017 because he missed his seizure medications.    His seizures were described as tonic seizures, with probably LOC.  The last 2 seizures lasted for 20 sec each.    He was stried on Depakote recently due to increased seizure frequency.  Unfortunately, he experienced significant side effects from Depakote.  He was having significant hallucinations for the past two weeks, and Depakote had to be discontinued.    He had Botox injection for his right sided spasticity.  His spasticity improved.    According to the patient, he had a history of right-sided meningioma, status post resection in 01/2014. He had an intracerebral hemorrhage over the left side of the brain. He underwent a craniotomy for intraparenchymal hematoma on 01/02/2014. He had one seizure at the time of the hemorrhage. The description of the seizure is unclear.   In 11/2014, he had a repeat CT scan which showed dilatation of the left temporal horn related to entrapment. On 11/12/2014, Dr. Sudarshan Plummer performed an endoscopic fenestration of the cyst, and he was found to have seizures in the hospital. The patient reported that he had a total of four seizures since 11/2014. Three were generalized tonic-clonic  seizures. One was probably simple partial seizure. The simple partial seizure was right arm tonic posturing with no loss of consciousness. He remembered those and the generalized tonic-clonic seizures started with right hand tonic posturing and then progressed to generalized tonic-clonic movement. He reported that two of the seizures were witnessed. One was witnessed in the hospital and one was witnessed by his sister.     TRIGGERS FOR SEIZURES: Unclear except missing medications.   RISK FACTORS FOR SEIZURES: Left intraparenchymal hemorrhage of the brain in 01/2014. He had no history of head trauma with loss of consciousness. No history of CNS infection. No history of febrile convulsions.   PAST MEDICAL HISTORY: Cerebral intraparenchymal hemorrhage with hematoma, aneurysm basilar tip, right spastic hemiparesis, hypertension, seizures.   PAST SURGICAL HISTORY: Craniotomy with evacuation of hematoma subdural endoscopy, optical tracking system, ventriculostomy.     Current Outpatient Prescriptions   Medication Sig Dispense Refill     zonisamide (ZONEGRAN) 100 MG capsule Take 3 tabs (300 mg) at hs 270 capsule 3     sertraline (ZOLOFT) 100 MG tablet Take 100 mg by mouth       lamoTRIgine (LAMICTAL) 100 MG tablet Take 3 tabs (300 mg) in the morning and 4 tabs in the evening. 630 tablet 2     carvedilol (COREG) 6.25 MG tablet Take 1 tablet (6.25 mg) by mouth 2 times daily 60 tablet 0     acetaminophen (TYLENOL) 325 MG tablet Take 2 tablets (650 mg) by mouth every 6 hours as needed for mild pain       atorvastatin (LIPITOR) 10 MG tablet Take 1 tablet (10 mg) by mouth daily          ALLERGIES: No known drug allergy.   FAMILY HISTORY: No family history of seizures.   SOCIAL HISTORY: He is single, living by himself. Smokes one pack every five days cigarettes. No alcohol use. No drug abuse.   REVIEW OF SYSTEMS: Right-sided weakness and spasticity. Anxiety and seizures. The rest of the 10-point review of systems is negative.  "    PHYSICAL EXAMINATION:   Blood pressure 93/65, pulse 55, height 5' 7.01\" (170.2 cm), weight 190 lb (86.2 kg).    HEENT: Normocephalic, atraumatic.   NECK: Supple.   EXTREMITIES: No edema.     NEUROLOGIC EXAM: Alert and oriented x3. Speech fluent, appropriate.   CRANIAL NERVES: Pupils are equal, round, reactive to light and accommodation. Extraocular movement intact. No facial weakness.  MOTOR EXAM: Normal bulk, increased tone in right upper and right lower extremities. Strength is 4/5 over the right upper and lower extremities and 5/5 over the left upper and lower extremities.   COORDINATION: Finger-to-nose, heel-to-shin examinations were normal on the left side. The patient was not able to perform over the right side.   SENSORY: Decreased sensation over the right upper and lower extremities.   GAIT: Spastic hemiparetic gait with the assistance of a cane.     PREVIOUS DIAGNOSTIC TESTING: CT of the head on 04/17/2015 showed slight interval decrease in dilated left temporal cyst and the dilated left temporal horn since the prior exam, previous left-sided craniotomy and the white matter changes in the left hemisphere again noted. MRI scan on 10/07/2014 showed trapped left lateral ventricle with cystic dilatation and adjacent vasogenic edema causing mass effect, unchanged. MRI scan on 01/02/2014 showed increased size of a hyperacute to acute intraparenchymal hemorrhage in the left posterior frontoparietal region with hemorrhage extending into an adjacent area of preexisting encephalomalacia as well as the left lateral ventricle. There is new blood extending into the right lateral ventricle occipital horn and possibly the fourth ventricle. Acute subarachnoid hemorrhage is again seen over the left parietal and the temporal lobe and insula with possible new subarachnoid blood over the cerebellar folia. There are two regions of enhancement along the anteromedial and anterolateral hemorrhage cavity which may represent " sites of residual or recurrent tumor. Comparison with prior image would likely be helpful.   EEG on of 12/01/2014 showed abnormal. There is evidence of focal cerebral dysfunction over the left hemisphere, especially the left posterior hemisphere. This is consistent with focal cerebral dysfunction in this region.   EEG on 01/03/2014 showed abnormal EEG due to the presence of mild diffuse slowing consistent with mild diffuse encephalopathy as well as additional severe slowing over the left temporal parietal head region consistent with the patient's history of subdural hematoma in this region.     Component      Latest Ref Rng & Units 10/17/2016 4/3/2017   Lamotrigine Level       10.1 12.4   Zonisamide Level Quant        17.3       IMPRESSION:   1. Seizures. This patient is a 57-year-old right-handed male returns for follow up for seizures.   He had a history of right-sided meningioma, status post resection in 01/2014. He had an intracerebral hemorrhage over the left side of the brain.  He was previously seen by Dr. Michael Rodriguez at [facility] for his seizures.  The patient is accompanied by his friend Maribeth in the clinic today.  Since the last visit, he had no seizures.  He is now on Lamictal 300 - 400 and Zonegran 300 qhs.  Hie was having seizures once every 5-6 weeks before started on Zonegran.  It seems that he did not have any seizures since started on Zonegran as long as he did not miss any medications.  His last seizure was in March 2017 because he missed his seizure medications.    2. History of intracerebral hemorrhage status post evacuation of hematoma.   3. Status post endoscopic fenestration of the left temporal horn cyst related to entrapment.   4. Hypertension.   5. Anxiety.   6. Right spastic hemiparesis.  Improved with Botox.    PLAN:   1. Continue Lamictal 300-400 mg and Zonegran 300mg qhs.  2. Return to clinic in three months.  If patient continue to have seizures, options in the future are a.  Increase Lamictal or Zonegran, b. Add Vimpat.

## 2017-08-16 ENCOUNTER — TELEPHONE (OUTPATIENT)
Dept: NEUROLOGY | Facility: CLINIC | Age: 57
End: 2017-08-16

## 2017-08-16 DIAGNOSIS — G40.909 SEIZURE DISORDER (H): Primary | ICD-10-CM

## 2017-08-16 NOTE — TELEPHONE ENCOUNTER
Nurse received him basket message as follows:      Caller: Husam Batista     Relationship to Patient: Power of      Call Back Number: [phone]     Reason for Call: Has questions regarding the patient's medications. She sent an email to Dr. Hirsch in regards to this and hasn't heard anything back. Please give her a call back. Thanks.     Return phone call to Husam, who indicates that since starting on the new med in January, patient has had a change in personality; he has been angry, screaming at staff and at neighbors and has had 2 seizures since last visit with Dr. Hirsch.    Nurse indicated to caller that these changes are a possible side effect of the Zonisamide. Also told her that this message would be forwarded to Dr. Hirsch when he was in tomorrow.  Nurse indicated that we should get lab values to see that his numbness might levels not too high, no symbols labs to review [place] where he will go to have them drawn.

## 2017-08-17 DIAGNOSIS — G40.909 SEIZURE DISORDER (H): ICD-10-CM

## 2017-08-17 LAB — VALPROATE SERPL-MCNC: <3 MG/L (ref 50–100)

## 2017-08-17 PROCEDURE — 36415 COLL VENOUS BLD VENIPUNCTURE: CPT | Performed by: PSYCHIATRY & NEUROLOGY

## 2017-08-17 PROCEDURE — 80164 ASSAY DIPROPYLACETIC ACD TOT: CPT | Performed by: PSYCHIATRY & NEUROLOGY

## 2017-08-18 LAB
LAMOTRIGINE SERPL-MCNC: 10.1 UG/ML (ref 2.5–15)
LAMOTRIGINE SERPL-MCNC: 10.5 UG/ML

## 2017-08-18 NOTE — TELEPHONE ENCOUNTER
Spoke to Dr. Hirsch about patient and his anger issues; Dr. Pastor does not believe that this is due to the Zonisamide as it is occurring much later than the zonisamide was started.  Per Dr. Hirsch's request, this nurse spoke with Maribeth again to inquire as to when the anger flareups began. Called and left a message with Maribeth who calls back now indicating that patient began to have these anger issues most strongly over the last month.  Zonisamide level is not finished running, however I will check on it again, and if it is out of range I will inform Dr. Hirsch.

## 2017-08-19 LAB — ZONISAMIDE SERPL-MCNC: 20 UG/ML (ref 10–40)

## 2017-08-21 NOTE — TELEPHONE ENCOUNTER
Zonisamide level came minute 20 which is midway of therapeutic level.  In previous call nurse had notify Maribeth, his POA, and I would inform Dr. Hirsch if this level was out of range. And indicate to her that unless it was out of range his meds would not be changed.  She is agreeable with that plan.

## 2018-02-19 ENCOUNTER — OFFICE VISIT (OUTPATIENT)
Dept: NEUROLOGY | Facility: CLINIC | Age: 58
End: 2018-02-19
Payer: MEDICARE

## 2018-02-19 VITALS
HEART RATE: 62 BPM | DIASTOLIC BLOOD PRESSURE: 64 MMHG | BODY MASS INDEX: 32.41 KG/M2 | SYSTOLIC BLOOD PRESSURE: 140 MMHG | WEIGHT: 207 LBS

## 2018-02-19 DIAGNOSIS — G40.909 SEIZURE DISORDER (H): ICD-10-CM

## 2018-02-19 DIAGNOSIS — R56.9 CONVULSIONS, UNSPECIFIED CONVULSION TYPE (H): ICD-10-CM

## 2018-02-19 RX ORDER — LAMOTRIGINE 100 MG/1
TABLET ORAL
Qty: 630 TABLET | Refills: 3 | Status: SHIPPED | OUTPATIENT
Start: 2018-02-19 | End: 2018-07-16

## 2018-02-19 RX ORDER — ZONISAMIDE 100 MG/1
CAPSULE ORAL
Qty: 270 CAPSULE | Refills: 3 | Status: SHIPPED | OUTPATIENT
Start: 2018-02-19 | End: 2018-07-16

## 2018-02-19 NOTE — LETTER
2018       RE: Garcia Canada  : 1960   MRN: [id number]      Dear Colleague,    Thank you for referring your patient, Garcia Canada, to the [facility] at [facility]. Please see a copy of my visit note below.    CHIEF COMPLAINT: Seizures.    HISTORY OF PRESENT ILLNESS: This patient is a 57-year-old right-handed male returns for follow up for seizures.   He had a history of right-sided meningioma, status post resection in 2014. He had an intracerebral hemorrhage over the left side of the brain.  He was previously seen by Dr. Michael Rodriguez at [facility] for his seizures.  The patient is accompanied by his friend Maribeth in the clinic today.  Since the last visit, he had no seizures.  He is now on Lamictal 300 - 400 and Zonegran 300 qhs.  Hie was having seizures once every 5-6 weeks before started on Zonegran.  It seems that his seizures were controlled much better since started on Zonegran.  His last seizure was in 2017, his is not sure if he missed his seizure medications at that time.  Patient is happy with the seizure control.    His seizures were described as tonic seizures, with probably LOC.  The last 2 seizures lasted for 20 sec each.    He was stried on Depakote recently due to increased seizure frequency.  Unfortunately, he experienced significant side effects from Depakote.  He was having significant hallucinations for the past two weeks, and Depakote had to be discontinued.    He had Botox injection for his right sided spasticity.  His spasticity improved.    According to the patient, he had a history of right-sided meningioma, status post resection in 2014. He had an intracerebral hemorrhage over the left side of the brain. He underwent a craniotomy for intraparenchymal hematoma on 2014. He had one seizure at the time of the hemorrhage. The description of the seizure is unclear.   In 2014, he had a repeat CT scan which  showed dilatation of the left temporal horn related to entrapment. On 11/12/2014, Dr. Sudarshan Plummer performed an endoscopic fenestration of the cyst, and he was found to have seizures in the hospital. The patient reported that he had a total of four seizures since 11/2014. Three were generalized tonic-clonic seizures. One was probably simple partial seizure. The simple partial seizure was right arm tonic posturing with no loss of consciousness. He remembered those and the generalized tonic-clonic seizures started with right hand tonic posturing and then progressed to generalized tonic-clonic movement. He reported that two of the seizures were witnessed. One was witnessed in the hospital and one was witnessed by his sister.     TRIGGERS FOR SEIZURES: Unclear except missing medications.   RISK FACTORS FOR SEIZURES: Left intraparenchymal hemorrhage of the brain in 01/2014. He had no history of head trauma with loss of consciousness. No history of CNS infection. No history of febrile convulsions.   PAST MEDICAL HISTORY: Cerebral intraparenchymal hemorrhage with hematoma, aneurysm basilar tip, right spastic hemiparesis, hypertension, seizures.   PAST SURGICAL HISTORY: Craniotomy with evacuation of hematoma subdural endoscopy, optical tracking system, ventriculostomy.     Current Outpatient Prescriptions   Medication Sig Dispense Refill     IBUPROFEN PO Take 1-2 tablets by mouth every 6 hours as needed for moderate pain       lamoTRIgine (LAMICTAL) 100 MG tablet Take 3 tabs (300 mg) in the morning and 4 tabs in the evening. 630 tablet 3     zonisamide (ZONEGRAN) 100 MG capsule Take 3 tabs (300 mg) at hs 270 capsule 3     sertraline (ZOLOFT) 100 MG tablet Take 200 mg by mouth        carvedilol (COREG) 6.25 MG tablet Take 1 tablet (6.25 mg) by mouth 2 times daily 60 tablet 0     atorvastatin (LIPITOR) 10 MG tablet Take 1 tablet (10 mg) by mouth daily       acetaminophen (TYLENOL) 325 MG tablet Take 2 tablets (650 mg) by  mouth every 6 hours as needed for mild pain          ALLERGIES: No known drug allergy.   FAMILY HISTORY: No family history of seizures.   SOCIAL HISTORY: He is single, living by himself. Smokes one pack every five days cigarettes. No alcohol use. No drug abuse.   REVIEW OF SYSTEMS: Right-sided weakness and spasticity. Anxiety and seizures. The rest of the 10-point review of systems is negative.     PHYSICAL EXAMINATION:   Blood pressure 140/64, pulse 62, weight 207 lb (93.9 kg).    HEENT: Normocephalic, atraumatic.   NECK: Supple.   EXTREMITIES: No edema.     NEUROLOGIC EXAM: Alert and oriented x3. Speech fluent, appropriate.   CRANIAL NERVES: Pupils are equal, round, reactive to light and accommodation. Extraocular movement intact. No facial weakness.  MOTOR EXAM: Normal bulk, increased tone in right upper and right lower extremities. Strength is 4/5 over the right upper and lower extremities and 5/5 over the left upper and lower extremities.   COORDINATION: Finger-to-nose, heel-to-shin examinations were normal on the left side. The patient was not able to perform over the right side.   SENSORY: Decreased sensation over the right upper and lower extremities.   GAIT: Spastic hemiparetic gait with the assistance of a cane.     PREVIOUS DIAGNOSTIC TESTING: CT of the head on 04/17/2015 showed slight interval decrease in dilated left temporal cyst and the dilated left temporal horn since the prior exam, previous left-sided craniotomy and the white matter changes in the left hemisphere again noted. MRI scan on 10/07/2014 showed trapped left lateral ventricle with cystic dilatation and adjacent vasogenic edema causing mass effect, unchanged. MRI scan on 01/02/2014 showed increased size of a hyperacute to acute intraparenchymal hemorrhage in the left posterior frontoparietal region with hemorrhage extending into an adjacent area of preexisting encephalomalacia as well as the left lateral ventricle. There is new blood  extending into the right lateral ventricle occipital horn and possibly the fourth ventricle. Acute subarachnoid hemorrhage is again seen over the left parietal and the temporal lobe and insula with possible new subarachnoid blood over the cerebellar folia. There are two regions of enhancement along the anteromedial and anterolateral hemorrhage cavity which may represent sites of residual or recurrent tumor. Comparison with prior image would likely be helpful.   EEG on of 12/01/2014 showed abnormal. There is evidence of focal cerebral dysfunction over the left hemisphere, especially the left posterior hemisphere. This is consistent with focal cerebral dysfunction in this region.   EEG on 01/03/2014 showed abnormal EEG due to the presence of mild diffuse slowing consistent with mild diffuse encephalopathy as well as additional severe slowing over the left temporal parietal head region consistent with the patient's history of subdural hematoma in this region.     Component      Latest Ref Rng & Units 10/17/2016 4/3/2017   Lamotrigine Level       10.1 12.4   Zonisamide Level Quant        17.3       IMPRESSION:   1. Seizures. This patient is a 57-year-old right-handed male returns for follow up for seizures.   He had a history of right-sided meningioma, status post resection in 01/2014. He had an intracerebral hemorrhage over the left side of the brain.  He was previously seen by Dr. Michael Rodriguez at [facility] for his seizures.  The patient is accompanied by his friend Maribeth in the clinic today.  Since the last visit, he had no seizures.  He is now on Lamictal 300 - 400 and Zonegran 300 qhs.  Hie was having seizures once every 5-6 weeks before started on Zonegran.  It seems that his seizures were controlled much better since started on Zonegran.  His last seizure was in July 2017, his is not sure if he missed his seizure medications at that time.  Patient is happy with the seizure control.    2. History of  intracerebral hemorrhage status post evacuation of hematoma.   3. Status post endoscopic fenestration of the left temporal horn cyst related to entrapment.   4. Hypertension.   5. Anxiety.   6. Right spastic hemiparesis.  Improved with Botox.    PLAN:   1. Continue Lamictal 300-400 mg and Zonegran 300mg qhs.  2. Return to clinic in 6 months.  If patient continue to have seizures, options in the future are a. Increase Lamictal or Zonegran, b. Add Vimpat.    25 min was spent on the visit.  Over 50% of the time was spent on education, counseling about optimal seizure control and coordination of care.    Sincerely,    Rishi Hirsch MD

## 2018-02-19 NOTE — PROGRESS NOTES
CHIEF COMPLAINT: Seizures.    HISTORY OF PRESENT ILLNESS: This patient is a 57-year-old right-handed male returns for follow up for seizures.   He had a history of right-sided meningioma, status post resection in 01/2014. He had an intracerebral hemorrhage over the left side of the brain.  He was previously seen by Dr. Michael Rodriguez at [facility] for his seizures.  The patient is accompanied by his friend Maribeth in the clinic today.  Since the last visit, he had no seizures.  He is now on Lamictal 300 - 400 and Zonegran 300 qhs.  Samira was having seizures once every 5-6 weeks before started on Zonegran.  It seems that his seizures were controlled much better since started on Zonegran.  His last seizure was in July 2017, his is not sure if he missed his seizure medications at that time.  Patient is happy with the seizure control.    His seizures were described as tonic seizures, with probably LOC.  The last 2 seizures lasted for 20 sec each.    He was stried on Depakote recently due to increased seizure frequency.  Unfortunately, he experienced significant side effects from Depakote.  He was having significant hallucinations for the past two weeks, and Depakote had to be discontinued.    He had Botox injection for his right sided spasticity.  His spasticity improved.    According to the patient, he had a history of right-sided meningioma, status post resection in 01/2014. He had an intracerebral hemorrhage over the left side of the brain. He underwent a craniotomy for intraparenchymal hematoma on 01/02/2014. He had one seizure at the time of the hemorrhage. The description of the seizure is unclear.   In 11/2014, he had a repeat CT scan which showed dilatation of the left temporal horn related to entrapment. On 11/12/2014, Dr. Sudarshan Plummer performed an endoscopic fenestration of the cyst, and he was found to have seizures in the hospital. The patient reported that he had a total of four seizures since 11/2014.  Three were generalized tonic-clonic seizures. One was probably simple partial seizure. The simple partial seizure was right arm tonic posturing with no loss of consciousness. He remembered those and the generalized tonic-clonic seizures started with right hand tonic posturing and then progressed to generalized tonic-clonic movement. He reported that two of the seizures were witnessed. One was witnessed in the hospital and one was witnessed by his sister.     TRIGGERS FOR SEIZURES: Unclear except missing medications.   RISK FACTORS FOR SEIZURES: Left intraparenchymal hemorrhage of the brain in 01/2014. He had no history of head trauma with loss of consciousness. No history of CNS infection. No history of febrile convulsions.   PAST MEDICAL HISTORY: Cerebral intraparenchymal hemorrhage with hematoma, aneurysm basilar tip, right spastic hemiparesis, hypertension, seizures.   PAST SURGICAL HISTORY: Craniotomy with evacuation of hematoma subdural endoscopy, optical tracking system, ventriculostomy.     Current Outpatient Prescriptions   Medication Sig Dispense Refill     IBUPROFEN PO Take 1-2 tablets by mouth every 6 hours as needed for moderate pain       lamoTRIgine (LAMICTAL) 100 MG tablet Take 3 tabs (300 mg) in the morning and 4 tabs in the evening. 630 tablet 3     zonisamide (ZONEGRAN) 100 MG capsule Take 3 tabs (300 mg) at hs 270 capsule 3     sertraline (ZOLOFT) 100 MG tablet Take 200 mg by mouth        carvedilol (COREG) 6.25 MG tablet Take 1 tablet (6.25 mg) by mouth 2 times daily 60 tablet 0     atorvastatin (LIPITOR) 10 MG tablet Take 1 tablet (10 mg) by mouth daily       acetaminophen (TYLENOL) 325 MG tablet Take 2 tablets (650 mg) by mouth every 6 hours as needed for mild pain          ALLERGIES: No known drug allergy.   FAMILY HISTORY: No family history of seizures.   SOCIAL HISTORY: He is single, living by himself. Smokes one pack every five days cigarettes. No alcohol use. No drug abuse.   REVIEW OF  SYSTEMS: Right-sided weakness and spasticity. Anxiety and seizures. The rest of the 10-point review of systems is negative.     PHYSICAL EXAMINATION:   Blood pressure 140/64, pulse 62, weight 207 lb (93.9 kg).    HEENT: Normocephalic, atraumatic.   NECK: Supple.   EXTREMITIES: No edema.     NEUROLOGIC EXAM: Alert and oriented x3. Speech fluent, appropriate.   CRANIAL NERVES: Pupils are equal, round, reactive to light and accommodation. Extraocular movement intact. No facial weakness.  MOTOR EXAM: Normal bulk, increased tone in right upper and right lower extremities. Strength is 4/5 over the right upper and lower extremities and 5/5 over the left upper and lower extremities.   COORDINATION: Finger-to-nose, heel-to-shin examinations were normal on the left side. The patient was not able to perform over the right side.   SENSORY: Decreased sensation over the right upper and lower extremities.   GAIT: Spastic hemiparetic gait with the assistance of a cane.     PREVIOUS DIAGNOSTIC TESTING: CT of the head on 04/17/2015 showed slight interval decrease in dilated left temporal cyst and the dilated left temporal horn since the prior exam, previous left-sided craniotomy and the white matter changes in the left hemisphere again noted. MRI scan on 10/07/2014 showed trapped left lateral ventricle with cystic dilatation and adjacent vasogenic edema causing mass effect, unchanged. MRI scan on 01/02/2014 showed increased size of a hyperacute to acute intraparenchymal hemorrhage in the left posterior frontoparietal region with hemorrhage extending into an adjacent area of preexisting encephalomalacia as well as the left lateral ventricle. There is new blood extending into the right lateral ventricle occipital horn and possibly the fourth ventricle. Acute subarachnoid hemorrhage is again seen over the left parietal and the temporal lobe and insula with possible new subarachnoid blood over the cerebellar folia. There are two regions  of enhancement along the anteromedial and anterolateral hemorrhage cavity which may represent sites of residual or recurrent tumor. Comparison with prior image would likely be helpful.   EEG on of 12/01/2014 showed abnormal. There is evidence of focal cerebral dysfunction over the left hemisphere, especially the left posterior hemisphere. This is consistent with focal cerebral dysfunction in this region.   EEG on 01/03/2014 showed abnormal EEG due to the presence of mild diffuse slowing consistent with mild diffuse encephalopathy as well as additional severe slowing over the left temporal parietal head region consistent with the patient's history of subdural hematoma in this region.     Component      Latest Ref Rng & Units 10/17/2016 4/3/2017   Lamotrigine Level       10.1 12.4   Zonisamide Level Quant        17.3       IMPRESSION:   1. Seizures. This patient is a 57-year-old right-handed male returns for follow up for seizures.   He had a history of right-sided meningioma, status post resection in 01/2014. He had an intracerebral hemorrhage over the left side of the brain.  He was previously seen by Dr. Michael Rodriguez at [facility] for his seizures.  The patient is accompanied by his friend Maribeth in the clinic today.  Since the last visit, he had no seizures.  He is now on Lamictal 300 - 400 and Zonegran 300 qhs.  Hie was having seizures once every 5-6 weeks before started on Zonegran.  It seems that his seizures were controlled much better since started on Zonegran.  His last seizure was in July 2017, his is not sure if he missed his seizure medications at that time.  Patient is happy with the seizure control.    2. History of intracerebral hemorrhage status post evacuation of hematoma.   3. Status post endoscopic fenestration of the left temporal horn cyst related to entrapment.   4. Hypertension.   5. Anxiety.   6. Right spastic hemiparesis.  Improved with Botox.    PLAN:   1. Continue Lamictal 300-400 mg  and Zonegran 300mg qhs.  2. Return to clinic in 6 months.  If patient continue to have seizures, options in the future are a. Increase Lamictal or Zonegran, b. Add Vimpat.        25 min was spent on the visit.  Over 50% of the time was spent on education, counseling about optimal seizure control and coordination of care.

## 2018-02-19 NOTE — MR AVS SNAPSHOT
After Visit Summary   2/19/2018    Garcia Canada    MRN: [id number]           Patient Information     Date Of Birth          1960        Visit Information        Provider Department      2/19/2018 3:30 PM Rishi Hirsch MD [facility]        Today's Diagnoses     Seizure disorder (H)        Convulsions, unspecified convulsion type (H)          Care Instructions      Times of Days am pm        Medication Tablet Size Number of Tablets/Capsules Total Daily Dosage    Lamictal  100  3 4            Zonegran  100  3                                                                                                                           Carry this with you at all times.  CONTINUE TAKING YOUR OTHER MEDICATIONS AS PREVIOUSLY DIRECTED.      * * *Do not store medications in the bathroom.  Keep medications away from children!* * *             Follow-ups after your visit        Follow-up notes from your care team     Return in about 6 months (around 8/19/2018).      Who to contact     Please call your clinic at [phone] to:    Ask questions about your health    Make or cancel appointments    Discuss your medicines    Learn about your test results    Speak to your doctor            Additional Information About Your Visit        [employer] Information     [employer] gives you secure access to your electronic health record. If you see a primary care provider, you can also send messages to your care team and make appointments. If you have questions, please call your primary care clinic.  If you do not have a primary care provider, please call [phone] and they will assist you.      [employer] is an electronic gateway that provides easy, online access to your medical records. With [employer], you can request a clinic appointment, read your test results, renew a prescription or communicate with your care team.     To access your existing account, please contact your [facility] or  call [phone] for assistance.        Care EveryWhere ID     This is your Care EveryWhere ID. This could be used by other organizations to access your [place] medical records  [id number]        Your Vitals Were     Pulse BMI (Body Mass Index)                62 32.41 kg/m2           Blood Pressure from Last 3 Encounters:   02/19/18 140/64   07/17/17 93/65   04/03/17 108/59    Weight from Last 3 Encounters:   02/19/18 93.9 kg (207 lb)   07/17/17 86.2 kg (190 lb)   04/03/17 88.7 kg (195 lb 9.6 oz)              Today, you had the following     No orders found for display         Where to get your medicines      These medications were sent to [facility] [id number] [place] [address] AT [facility] OF [address] [place] [address]  [address] [facility] [address]     Phone:  [phone]     lamoTRIgine 100 MG tablet    zonisamide 100 MG capsule          Primary Care Provider Office Phone # Fax #    Steven Duane Semmler, [phone]       [facility] [address] [facility] [address]        Equal Access to Services     LALITA POPE AH: Hadii nate ku hadasho Soomaali, waaxda luqadaha, qaybta kaalmada adeegyada, sandee lowein haygilmern amor dailey. So [facility] [phone].    ATENCIÓN: Si habla español, tiene a wagner disposición servicios gratuitos de asistencia lingüística. [facility] [phone].    We comply with applicable federal civil rights laws and [place] laws. We do not discriminate on the basis of race, color, national origin, age, disability, sex, sexual orientation, or gender identity.            Thank you!     Thank you for choosing [facility]  for your care. Our goal is always to provide you with excellent care. Hearing back from our patients is one way we can continue to improve our services. Please take a few minutes to complete the written survey that you may receive in the mail after your visit with us. Thank you!             Your  Updated Medication List - Protect others around you: Learn how to safely use, store and throw away your medicines at www.disposemymeds.org.          This list is accurate as of 2/19/18 11:59 PM.  Always use your most recent med list.                   Brand Name Dispense Instructions for use Diagnosis    acetaminophen 325 MG tablet    TYLENOL     Take 2 tablets (650 mg) by mouth every 6 hours as needed for mild pain    Hydrocephalus, acquired, Intraparenchymal hemorrhage of brain (H)       atorvastatin 10 MG tablet    LIPITOR     Take 1 tablet (10 mg) by mouth daily    Hydrocephalus       carvedilol 6.25 MG tablet    COREG    60 tablet    Take 1 tablet (6.25 mg) by mouth 2 times daily    Hypertension       IBUPROFEN PO      Take 1-2 tablets by mouth every 6 hours as needed for moderate pain        lamoTRIgine 100 MG tablet    LaMICtal    630 tablet    Take 3 tabs (300 mg) in the morning and 4 tabs in the evening.    Convulsions, unspecified convulsion type (H)       sertraline 100 MG tablet    ZOLOFT     Take 200 mg by mouth        zonisamide 100 MG capsule    ZONEGRAN    270 capsule    Take 3 tabs (300 mg) at hs    Seizure disorder (H)

## 2018-02-19 NOTE — PATIENT INSTRUCTIONS
Times of Days am pm        Medication Tablet Size Number of Tablets/Capsules Total Daily Dosage    Lamictal  100  3 4            Zonegran  100  3                                                                                                                           Carry this with you at all times.  CONTINUE TAKING YOUR OTHER MEDICATIONS AS PREVIOUSLY DIRECTED.      * * *Do not store medications in the bathroom.  Keep medications away from children!* * *

## 2018-07-16 ENCOUNTER — OFFICE VISIT (OUTPATIENT)
Dept: NEUROLOGY | Facility: CLINIC | Age: 58
End: 2018-07-16
Payer: MEDICARE

## 2018-07-16 VITALS
BODY MASS INDEX: 28.68 KG/M2 | TEMPERATURE: 97.4 F | WEIGHT: 183.2 LBS | DIASTOLIC BLOOD PRESSURE: 64 MMHG | SYSTOLIC BLOOD PRESSURE: 120 MMHG | HEART RATE: 69 BPM

## 2018-07-16 DIAGNOSIS — G40.909 SEIZURE DISORDER (H): ICD-10-CM

## 2018-07-16 DIAGNOSIS — R56.9 CONVULSIONS, UNSPECIFIED CONVULSION TYPE (H): ICD-10-CM

## 2018-07-16 RX ORDER — ZONISAMIDE 100 MG/1
CAPSULE ORAL
Qty: 270 CAPSULE | Refills: 3 | Status: SHIPPED | OUTPATIENT
Start: 2018-07-16 | End: 2019-07-25

## 2018-07-16 RX ORDER — LAMOTRIGINE 100 MG/1
TABLET ORAL
Qty: 630 TABLET | Refills: 3 | Status: SHIPPED | OUTPATIENT
Start: 2018-07-16 | End: 2019-07-25

## 2018-07-16 ASSESSMENT — PAIN SCALES - GENERAL: PAINLEVEL: NO PAIN (0)

## 2018-07-16 NOTE — MR AVS SNAPSHOT
After Visit Summary   7/16/2018    Garcia Canada    MRN: [id number]           Patient Information     Date Of Birth          1960        Visit Information        Provider Department      7/16/2018 2:30 PM Rishi Hirsch MD [facility]        Today's Diagnoses     Seizure disorder (H)        Convulsions, unspecified convulsion type (H)          Care Instructions      Times of Days am pm        Medication Tablet Size Number of Tablets/Capsules Total Daily Dosage    Lamictal 100  3 4        700 mg    Zonegran 100  3          300 mg                                                                                                                 Carry this with you at all times.  CONTINUE TAKING YOUR OTHER MEDICATIONS AS PREVIOUSLY DIRECTED.      * * *Do not store medications in the bathroom.  Keep medications away from children!* * *             Follow-ups after your visit        Follow-up notes from your care team     Return in about 1 year (around 7/16/2019).      Who to contact     Please call your clinic at [phone] to:    Ask questions about your health    Make or cancel appointments    Discuss your medicines    Learn about your test results    Speak to your doctor            Additional Information About Your Visit        [employer] Information     [employer] gives you secure access to your electronic health record. If you see a primary care provider, you can also send messages to your care team and make appointments. If you have questions, please call your primary care clinic.  If you do not have a primary care provider, please call [phone] and they will assist you.      [employer] is an electronic gateway that provides easy, online access to your medical records. With [employer], you can request a clinic appointment, read your test results, renew a prescription or communicate with your care team.     To access your existing account, please contact your [facility]inic or call [phone] for assistance.        Care EveryWhere ID     This is your Care EveryWhere ID. This could be used by other organizations to access your [facility] medical records  [id number]        Your Vitals Were     Pulse Temperature BMI (Body Mass Index)             69 97.4  F (36.3  C) 28.68 kg/m2          Blood Pressure from Last 3 Encounters:   07/16/18 120/64   02/19/18 140/64   07/17/17 93/65    Weight from Last 3 Encounters:   07/16/18 183 lb 3.2 oz (83.1 kg)   02/19/18 207 lb (93.9 kg)   07/17/17 190 lb (86.2 kg)              We Performed the Following     Lamotrigine Level     Zonisamide Level Quantitative          Where to get your medicines      These medications were sent to [facility] [address] [place] [address] [address] AT [facility] OF [address] [place] [address]  [address] [facility] [address]     Phone:  [phone]     lamoTRIgine 100 MG tablet    zonisamide 100 MG capsule          Primary Care Provider Office Phone # Fax #    Steven Duane Semmler, [phone]       [facility] [address] [facility] [address]        Equal Access to Services     LALITA POPE AH: Hadii nate ku hadasho Soomaali, waaxda luqadaha, qaybta kaalmada adeegyada, waxay chrissyin haygilmern amor dailey. So [facility] [phone].    ATENCIÓN: Si habla español, tiene a wagner disposición servicios gratuitos de asistencia lingüística. ame al [phone].    We comply with applicable federal civil rights laws and [place] laws. We do not discriminate on the basis of race, color, national origin, age, disability, sex, sexual orientation, or gender identity.            Thank you!     Thank you for choosing [facility]  for your care. Our goal is always to provide you with excellent care. Hearing back from our patients is one way we can continue to improve our services. Please take a few minutes to complete the written survey that you may receive in the mail  after your visit with us. Thank you!             Your Updated Medication List - Protect others around you: Learn how to safely use, store and throw away your medicines at www.disposemymeds.org.          This list is accurate as of 7/16/18  3:09 PM.  Always use your most recent med list.                   Brand Name Dispense Instructions for use Diagnosis    acetaminophen 325 MG tablet    TYLENOL     Take 2 tablets (650 mg) by mouth every 6 hours as needed for mild pain    Hydrocephalus, acquired, Intraparenchymal hemorrhage of brain (H)       atorvastatin 10 MG tablet    LIPITOR     Take 1 tablet (10 mg) by mouth daily    Hydrocephalus       carvedilol 6.25 MG tablet    COREG    60 tablet    Take 1 tablet (6.25 mg) by mouth 2 times daily    Hypertension       IBUPROFEN PO      Take 1-2 tablets by mouth every 6 hours as needed for moderate pain        lamoTRIgine 100 MG tablet    LaMICtal    630 tablet    Take 3 tabs (300 mg) in the morning and 4 tabs in the evening.    Convulsions, unspecified convulsion type (H)       sertraline 100 MG tablet    ZOLOFT     Take 200 mg by mouth        zonisamide 100 MG capsule    ZONEGRAN    270 capsule    Take 3 tabs (300 mg) at hs    Seizure disorder (H)

## 2018-07-16 NOTE — LETTER
2018       RE: Garcia Canada  : 1960   MRN: [id number]      Dear Colleague,    Thank you for referring your patient, Garcia Canada, to the [facility] at [facility]. Please see a copy of my visit note below.    CHIEF COMPLAINT: Seizures.    HISTORY OF PRESENT ILLNESS: This patient is a 58-year-old right-handed male returns for follow up for seizures.   He had a history of right-sided meningioma, status post resection in 2014. He had an intracerebral hemorrhage over the left side of the brain. The patient is accompanied by his friend Maribeth in the clinic today.  Since the last visit, he had no seizures.  He is now on Lamictal 300-400 and Zonegran 300 qhs.  Hie was having seizures once every 5-6 weeks before started on Zonegran.  It seems that his seizures were controlled much better since started on Zonegran.  His last seizure was in 2017. Patient is happy with the seizure control.    His seizures were described as tonic seizures, with probably LOC, which lasted for 20 sec each.    He was stried on Depakote recently due to increased seizure frequency.  Unfortunately, he experienced significant side effects from Depakote.  He was having significant hallucinations for the past two weeks, and Depakote had to be discontinued.    He had Botox injection for his right sided spasticity.  His spasticity improved.    According to the patient, he had a history of right-sided meningioma, status post resection in 2014. He had an intracerebral hemorrhage over the left side of the brain. He underwent a craniotomy for intraparenchymal hematoma on 2014. He had one seizure at the time of the hemorrhage. The description of the seizure is unclear.   In 2014, he had a repeat CT scan which showed dilatation of the left temporal horn related to entrapment. On 2014, Dr. Sudarshan Plummer performed an endoscopic fenestration of the cyst, and he was found to  have seizures in the hospital. The patient reported that he had a total of four seizures since 11/2014. Three were generalized tonic-clonic seizures. One was probably simple partial seizure. The simple partial seizure was right arm tonic posturing with no loss of consciousness. He remembered those and the generalized tonic-clonic seizures started with right hand tonic posturing and then progressed to generalized tonic-clonic movement. He reported that two of the seizures were witnessed. One was witnessed in the hospital and one was witnessed by his sister.     TRIGGERS FOR SEIZURES: Unclear except missing medications.   RISK FACTORS FOR SEIZURES: Left intraparenchymal hemorrhage of the brain in 01/2014. He had no history of head trauma with loss of consciousness. No history of CNS infection. No history of febrile convulsions.   PAST MEDICAL HISTORY: Cerebral intraparenchymal hemorrhage with hematoma, aneurysm basilar tip, right spastic hemiparesis, hypertension, seizures.   PAST SURGICAL HISTORY: Craniotomy with evacuation of hematoma subdural endoscopy, optical tracking system, ventriculostomy.     Current Outpatient Prescriptions   Medication Sig Dispense Refill     acetaminophen (TYLENOL) 325 MG tablet Take 2 tablets (650 mg) by mouth every 6 hours as needed for mild pain       atorvastatin (LIPITOR) 10 MG tablet Take 1 tablet (10 mg) by mouth daily       carvedilol (COREG) 6.25 MG tablet Take 1 tablet (6.25 mg) by mouth 2 times daily 60 tablet 0     IBUPROFEN PO Take 1-2 tablets by mouth every 6 hours as needed for moderate pain       lamoTRIgine (LAMICTAL) 100 MG tablet Take 3 tabs (300 mg) in the morning and 4 tabs in the evening. 630 tablet 3     sertraline (ZOLOFT) 100 MG tablet Take 200 mg by mouth        zonisamide (ZONEGRAN) 100 MG capsule Take 3 tabs (300 mg) at hs 270 capsule 3        ALLERGIES: No known drug allergy.   FAMILY HISTORY: No family history of seizures.   SOCIAL HISTORY: He is single,  living by himself. Smokes one pack every five days cigarettes. No alcohol use. No drug abuse.   REVIEW OF SYSTEMS: Right-sided weakness and spasticity. Anxiety and seizures. The rest of the 10-point review of systems is negative.     PHYSICAL EXAMINATION:   Blood pressure 120/64, pulse 69, temperature 97.4  F (36.3  C), weight 83.1 kg (183 lb 3.2 oz).    HEENT: Normocephalic, atraumatic.   NECK: Supple.   EXTREMITIES: No edema.     NEUROLOGIC EXAM: Alert and oriented x3. Speech fluent, appropriate.   CRANIAL NERVES: Pupils are equal, round, reactive to light and accommodation. Extraocular movement intact. No facial weakness.  MOTOR EXAM: Normal bulk, increased tone in right upper and right lower extremities. Strength is 4/5 over the right upper and lower extremities and 5/5 over the left upper and lower extremities.   COORDINATION: Finger-to-nose, heel-to-shin examinations were normal on the left side. The patient was not able to perform over the right side.   SENSORY: Decreased sensation over the right upper and lower extremities.   GAIT: Spastic hemiparetic gait with the assistance of a cane.     PREVIOUS DIAGNOSTIC TESTING: CT of the head on 04/17/2015 showed slight interval decrease in dilated left temporal cyst and the dilated left temporal horn since the prior exam, previous left-sided craniotomy and the white matter changes in the left hemisphere again noted. MRI scan on 10/07/2014 showed trapped left lateral ventricle with cystic dilatation and adjacent vasogenic edema causing mass effect, unchanged. MRI scan on 01/02/2014 showed increased size of a hyperacute to acute intraparenchymal hemorrhage in the left posterior frontoparietal region with hemorrhage extending into an adjacent area of preexisting encephalomalacia as well as the left lateral ventricle. There is new blood extending into the right lateral ventricle occipital horn and possibly the fourth ventricle. Acute subarachnoid hemorrhage is again  seen over the left parietal and the temporal lobe and insula with possible new subarachnoid blood over the cerebellar folia. There are two regions of enhancement along the anteromedial and anterolateral hemorrhage cavity which may represent sites of residual or recurrent tumor. Comparison with prior image would likely be helpful.   EEG on of 12/01/2014 showed abnormal. There is evidence of focal cerebral dysfunction over the left hemisphere, especially the left posterior hemisphere. This is consistent with focal cerebral dysfunction in this region.   EEG on 01/03/2014 showed abnormal EEG due to the presence of mild diffuse slowing consistent with mild diffuse encephalopathy as well as additional severe slowing over the left temporal parietal head region consistent with the patient's history of subdural hematoma in this region.     Component      Latest Ref Rng & Units 10/17/2016 4/3/2017   Lamotrigine Level       10.1 12.4   Zonisamide Level Quant        17.3       IMPRESSION:   1. Seizures. This patient is a 58-year-old right-handed male returns for follow up for seizures.   He had a history of right-sided meningioma, status post resection in 01/2014. He had an intracerebral hemorrhage over the left side of the brain. The patient is accompanied by his friend Maribeth in the clinic today.  Since the last visit, he had no seizures.  He is now on Lamictal 300-400 and Zonegran 300 qhs.  Hie was having seizures once every 5-6 weeks before started on Zonegran.  It seems that his seizures were controlled much better since started on Zonegran.  His last seizure was in July 2017. Patient is happy with the seizure control.    His seizures were described as tonic seizures, with probably LOC, which lasted for 20 sec each.    2. History of intracerebral hemorrhage status post evacuation of hematoma.   3. Status post endoscopic fenestration of the left temporal horn cyst related to entrapment.   4. Hypertension.   5. Anxiety.    6. Right spastic hemiparesis.  Improved with Botox.    PLAN:   1. Continue Lamictal 300-400 mg and Zonegran 300mg qhs.  2. Return to clinic in 12 months.  If patient continue to have seizures, options in the future are a. Increase Lamictal or Zonegran, b. Add Vimpat.        25 min was spent on the visit.  Over 50% of the time was spent on education, counseling about optimal seizure control and coordination of care.      Again, thank you for allowing me to participate in the care of your patient.      Sincerely,    Rishi Hirsch MD

## 2018-07-16 NOTE — PROGRESS NOTES
CHIEF COMPLAINT: Seizures.    HISTORY OF PRESENT ILLNESS: This patient is a 58-year-old right-handed male returns for follow up for seizures.   He had a history of right-sided meningioma, status post resection in 01/2014. He had an intracerebral hemorrhage over the left side of the brain. The patient is accompanied by his friend Maribeth in the clinic today.  Since the last visit, he had no seizures.  He is now on Lamictal 300-400 and Zonegran 300 qhs.  Samira was having seizures once every 5-6 weeks before started on Zonegran.  It seems that his seizures were controlled much better since started on Zonegran.  His last seizure was in July 2017. Patient is happy with the seizure control.    His seizures were described as tonic seizures, with probably LOC, which lasted for 20 sec each.    He was stried on Depakote recently due to increased seizure frequency.  Unfortunately, he experienced significant side effects from Depakote.  He was having significant hallucinations for the past two weeks, and Depakote had to be discontinued.    He had Botox injection for his right sided spasticity.  His spasticity improved.    According to the patient, he had a history of right-sided meningioma, status post resection in 01/2014. He had an intracerebral hemorrhage over the left side of the brain. He underwent a craniotomy for intraparenchymal hematoma on 01/02/2014. He had one seizure at the time of the hemorrhage. The description of the seizure is unclear.   In 11/2014, he had a repeat CT scan which showed dilatation of the left temporal horn related to entrapment. On 11/12/2014, Dr. Sudarshan Plummer performed an endoscopic fenestration of the cyst, and he was found to have seizures in the hospital. The patient reported that he had a total of four seizures since 11/2014. Three were generalized tonic-clonic seizures. One was probably simple partial seizure. The simple partial seizure was right arm tonic posturing with no loss of  consciousness. He remembered those and the generalized tonic-clonic seizures started with right hand tonic posturing and then progressed to generalized tonic-clonic movement. He reported that two of the seizures were witnessed. One was witnessed in the hospital and one was witnessed by his sister.     TRIGGERS FOR SEIZURES: Unclear except missing medications.   RISK FACTORS FOR SEIZURES: Left intraparenchymal hemorrhage of the brain in 01/2014. He had no history of head trauma with loss of consciousness. No history of CNS infection. No history of febrile convulsions.   PAST MEDICAL HISTORY: Cerebral intraparenchymal hemorrhage with hematoma, aneurysm basilar tip, right spastic hemiparesis, hypertension, seizures.   PAST SURGICAL HISTORY: Craniotomy with evacuation of hematoma subdural endoscopy, optical tracking system, ventriculostomy.     Current Outpatient Prescriptions   Medication Sig Dispense Refill     acetaminophen (TYLENOL) 325 MG tablet Take 2 tablets (650 mg) by mouth every 6 hours as needed for mild pain       atorvastatin (LIPITOR) 10 MG tablet Take 1 tablet (10 mg) by mouth daily       carvedilol (COREG) 6.25 MG tablet Take 1 tablet (6.25 mg) by mouth 2 times daily 60 tablet 0     IBUPROFEN PO Take 1-2 tablets by mouth every 6 hours as needed for moderate pain       lamoTRIgine (LAMICTAL) 100 MG tablet Take 3 tabs (300 mg) in the morning and 4 tabs in the evening. 630 tablet 3     sertraline (ZOLOFT) 100 MG tablet Take 200 mg by mouth        zonisamide (ZONEGRAN) 100 MG capsule Take 3 tabs (300 mg) at hs 270 capsule 3        ALLERGIES: No known drug allergy.   FAMILY HISTORY: No family history of seizures.   SOCIAL HISTORY: He is single, living by himself. Smokes one pack every five days cigarettes. No alcohol use. No drug abuse.   REVIEW OF SYSTEMS: Right-sided weakness and spasticity. Anxiety and seizures. The rest of the 10-point review of systems is negative.     PHYSICAL EXAMINATION:   Blood  pressure 120/64, pulse 69, temperature 97.4  F (36.3  C), weight 83.1 kg (183 lb 3.2 oz).    HEENT: Normocephalic, atraumatic.   NECK: Supple.   EXTREMITIES: No edema.     NEUROLOGIC EXAM: Alert and oriented x3. Speech fluent, appropriate.   CRANIAL NERVES: Pupils are equal, round, reactive to light and accommodation. Extraocular movement intact. No facial weakness.  MOTOR EXAM: Normal bulk, increased tone in right upper and right lower extremities. Strength is 4/5 over the right upper and lower extremities and 5/5 over the left upper and lower extremities.   COORDINATION: Finger-to-nose, heel-to-shin examinations were normal on the left side. The patient was not able to perform over the right side.   SENSORY: Decreased sensation over the right upper and lower extremities.   GAIT: Spastic hemiparetic gait with the assistance of a cane.     PREVIOUS DIAGNOSTIC TESTING: CT of the head on 04/17/2015 showed slight interval decrease in dilated left temporal cyst and the dilated left temporal horn since the prior exam, previous left-sided craniotomy and the white matter changes in the left hemisphere again noted. MRI scan on 10/07/2014 showed trapped left lateral ventricle with cystic dilatation and adjacent vasogenic edema causing mass effect, unchanged. MRI scan on 01/02/2014 showed increased size of a hyperacute to acute intraparenchymal hemorrhage in the left posterior frontoparietal region with hemorrhage extending into an adjacent area of preexisting encephalomalacia as well as the left lateral ventricle. There is new blood extending into the right lateral ventricle occipital horn and possibly the fourth ventricle. Acute subarachnoid hemorrhage is again seen over the left parietal and the temporal lobe and insula with possible new subarachnoid blood over the cerebellar folia. There are two regions of enhancement along the anteromedial and anterolateral hemorrhage cavity which may represent sites of residual or  recurrent tumor. Comparison with prior image would likely be helpful.   EEG on of 12/01/2014 showed abnormal. There is evidence of focal cerebral dysfunction over the left hemisphere, especially the left posterior hemisphere. This is consistent with focal cerebral dysfunction in this region.   EEG on 01/03/2014 showed abnormal EEG due to the presence of mild diffuse slowing consistent with mild diffuse encephalopathy as well as additional severe slowing over the left temporal parietal head region consistent with the patient's history of subdural hematoma in this region.     Component      Latest Ref Rng & Units 10/17/2016 4/3/2017   Lamotrigine Level       10.1 12.4   Zonisamide Level Quant        17.3       IMPRESSION:   1. Seizures. This patient is a 58-year-old right-handed male returns for follow up for seizures.   He had a history of right-sided meningioma, status post resection in 01/2014. He had an intracerebral hemorrhage over the left side of the brain. The patient is accompanied by his friend Maribeth in the clinic today.  Since the last visit, he had no seizures.  He is now on Lamictal 300-400 and Zonegran 300 qhs.  Hijorge luis was having seizures once every 5-6 weeks before started on Zonegran.  It seems that his seizures were controlled much better since started on Zonegran.  His last seizure was in July 2017. Patient is happy with the seizure control.    His seizures were described as tonic seizures, with probably LOC, which lasted for 20 sec each.    2. History of intracerebral hemorrhage status post evacuation of hematoma.   3. Status post endoscopic fenestration of the left temporal horn cyst related to entrapment.   4. Hypertension.   5. Anxiety.   6. Right spastic hemiparesis.  Improved with Botox.    PLAN:   1. Continue Lamictal 300-400 mg and Zonegran 300mg qhs.  2. Return to clinic in 12 months.  If patient continue to have seizures, options in the future are a. Increase Lamictal or Zonegran, b. Add  Vimpat.        25 min was spent on the visit.  Over 50% of the time was spent on education, counseling about optimal seizure control and coordination of care.

## 2018-07-18 LAB
LAMOTRIGINE SERPL-MCNC: 11.5 UG/ML (ref 2.5–15)
ZONISAMIDE SERPL-MCNC: 20 UG/ML (ref 10–40)

## 2019-02-18 DIAGNOSIS — G40.909 SEIZURE DISORDER (H): ICD-10-CM

## 2019-02-19 RX ORDER — ZONISAMIDE 100 MG/1
CAPSULE ORAL
Qty: 270 CAPSULE | Refills: 3 | OUTPATIENT
Start: 2019-02-19

## 2019-02-19 NOTE — TELEPHONE ENCOUNTER
Patient was already prescribed a 12 month supply last July. Request sent back to the pharmacy requesting that they check their file for this Rx.

## 2019-07-25 ENCOUNTER — OFFICE VISIT (OUTPATIENT)
Dept: NEUROLOGY | Facility: CLINIC | Age: 59
End: 2019-07-25
Payer: MEDICARE

## 2019-07-25 VITALS — SYSTOLIC BLOOD PRESSURE: 88 MMHG | HEART RATE: 60 BPM | DIASTOLIC BLOOD PRESSURE: 57 MMHG

## 2019-07-25 DIAGNOSIS — G40.909 SEIZURE DISORDER (H): ICD-10-CM

## 2019-07-25 DIAGNOSIS — R56.9 CONVULSIONS, UNSPECIFIED CONVULSION TYPE (H): ICD-10-CM

## 2019-07-25 RX ORDER — LAMOTRIGINE 100 MG/1
TABLET ORAL
Qty: 630 TABLET | Refills: 3 | Status: SHIPPED | OUTPATIENT
Start: 2019-07-25 | End: 2020-05-21

## 2019-07-25 RX ORDER — ZONISAMIDE 100 MG/1
CAPSULE ORAL
Qty: 270 CAPSULE | Refills: 3 | Status: SHIPPED | OUTPATIENT
Start: 2019-07-25 | End: 2020-05-21

## 2019-07-25 ASSESSMENT — PAIN SCALES - GENERAL: PAINLEVEL: NO PAIN (0)

## 2019-07-25 NOTE — LETTER
2019     RE: Garcia Canada  : 1960   MRN: [id number]      Dear Colleague,    Thank you for referring your patient, Garcia Canada, to the [facility] at [facility]. Please see a copy of my visit note below.    CHIEF COMPLAINT: Seizures.    HISTORY OF PRESENT ILLNESS: This patient is a 59-year-old right-handed male returns for follow up for seizures.   He had a history of right-sided meningioma, status post resection in 2014. He had an intracerebral hemorrhage over the left side of the brain. The patient is accompanied by his friend Maribeth in the clinic today.  Since the last visit, he had no seizures.  He is now on Lamictal 300-400 and Zonegran 300 qhs.  Hie was having seizures once every 5-6 weeks before started on Zonegran.  It seemed that his seizures were controlled much better since started on Zonegran.  His last seizure was in 2017. Patient is very happy with the seizure control. He is compliant with the seizure meds.    His seizures were described as tonic seizures, with probably LOC, which lasted for 20 sec each.    He was stried on Depakote recently due to increased seizure frequency.  Unfortunately, he experienced significant side effects from Depakote.  He was having significant hallucinations for the past two weeks, and Depakote had to be discontinued.    He had Botox injection for his right sided spasticity.  His spasticity improved.    According to the patient, he had a history of right-sided meningioma, status post resection in 2014. He had an intracerebral hemorrhage over the left side of the brain. He underwent a craniotomy for intraparenchymal hematoma on 2014. He had one seizure at the time of the hemorrhage. The description of the seizure is unclear.   In 2014, he had a repeat CT scan which showed dilatation of the left temporal horn related to entrapment. On 2014, Dr. Sudarshan Plummer performed an endoscopic  fenestration of the cyst, and he was found to have seizures in the hospital. The patient reported that he had a total of four seizures since 11/2014. Three were generalized tonic-clonic seizures. One was probably simple partial seizure. The simple partial seizure was right arm tonic posturing with no loss of consciousness. He remembered those and the generalized tonic-clonic seizures started with right hand tonic posturing and then progressed to generalized tonic-clonic movement. He reported that two of the seizures were witnessed. One was witnessed in the hospital and one was witnessed by his sister.     TRIGGERS FOR SEIZURES: Unclear except missing medications.   RISK FACTORS FOR SEIZURES: Left intraparenchymal hemorrhage of the brain in 01/2014. He had no history of head trauma with loss of consciousness. No history of CNS infection. No history of febrile convulsions.   PAST MEDICAL HISTORY: Cerebral intraparenchymal hemorrhage with hematoma, aneurysm basilar tip, right spastic hemiparesis, hypertension, seizures.   PAST SURGICAL HISTORY: Craniotomy with evacuation of hematoma subdural endoscopy, optical tracking system, ventriculostomy.     Current Outpatient Medications   Medication Sig Dispense Refill     acetaminophen (TYLENOL) 325 MG tablet Take 2 tablets (650 mg) by mouth every 6 hours as needed for mild pain       atorvastatin (LIPITOR) 10 MG tablet Take 1 tablet (10 mg) by mouth daily       IBUPROFEN PO Take 1-2 tablets by mouth every 6 hours as needed for moderate pain       lamoTRIgine (LAMICTAL) 100 MG tablet Take 3 tabs (300 mg) in the morning and 4 tabs in the evening. 630 tablet 3     sertraline (ZOLOFT) 100 MG tablet Take 200 mg by mouth        zonisamide (ZONEGRAN) 100 MG capsule Take 3 tabs (300 mg) at hs 270 capsule 3     carvedilol (COREG) 6.25 MG tablet Take 1 tablet (6.25 mg) by mouth 2 times daily (Patient not taking: Reported on 7/25/2019) 60 tablet 0        ALLERGIES: No known drug  allergy.   FAMILY HISTORY: No family history of seizures.   SOCIAL HISTORY: He is single, living by himself. Smokes one pack every five days cigarettes. No alcohol use. No drug abuse.   REVIEW OF SYSTEMS: Right-sided weakness and spasticity. Anxiety and seizures. The rest of the 10-point review of systems is negative.     PHYSICAL EXAMINATION:   Blood pressure (!) 88/57, pulse 60.    HEENT: Normocephalic, atraumatic.   NECK: Supple.   EXTREMITIES: No edema.     NEUROLOGIC EXAM: Alert and oriented x3. Speech fluent, appropriate.   CRANIAL NERVES: Pupils are equal, round, reactive to light and accommodation. Extraocular movement intact. No facial weakness.  MOTOR EXAM: Normal bulk, increased tone in right upper and right lower extremities. Strength is 4/5 over the right upper and lower extremities and 5/5 over the left upper and lower extremities.   COORDINATION: Finger-to-nose, heel-to-shin examinations were normal on the left side. The patient was not able to perform over the right side.   SENSORY: Decreased sensation over the right upper and lower extremities.   GAIT: Spastic hemiparetic gait with the assistance of a cane.     PREVIOUS DIAGNOSTIC TESTING: CT of the head on 04/17/2015 showed slight interval decrease in dilated left temporal cyst and the dilated left temporal horn since the prior exam, previous left-sided craniotomy and the white matter changes in the left hemisphere again noted. MRI scan on 10/07/2014 showed trapped left lateral ventricle with cystic dilatation and adjacent vasogenic edema causing mass effect, unchanged. MRI scan on 01/02/2014 showed increased size of a hyperacute to acute intraparenchymal hemorrhage in the left posterior frontoparietal region with hemorrhage extending into an adjacent area of preexisting encephalomalacia as well as the left lateral ventricle. There is new blood extending into the right lateral ventricle occipital horn and possibly the fourth ventricle. Acute  subarachnoid hemorrhage is again seen over the left parietal and the temporal lobe and insula with possible new subarachnoid blood over the cerebellar folia. There are two regions of enhancement along the anteromedial and anterolateral hemorrhage cavity which may represent sites of residual or recurrent tumor. Comparison with prior image would likely be helpful.   EEG on of 12/01/2014 showed abnormal. There is evidence of focal cerebral dysfunction over the left hemisphere, especially the left posterior hemisphere. This is consistent with focal cerebral dysfunction in this region.   EEG on 01/03/2014 showed abnormal EEG due to the presence of mild diffuse slowing consistent with mild diffuse encephalopathy as well as additional severe slowing over the left temporal parietal head region consistent with the patient's history of subdural hematoma in this region.     Component      Latest Ref Rng & Units 10/17/2016 4/3/2017   Lamotrigine Level       10.1 12.4   Zonisamide Level Quant        17.3       IMPRESSION:   1. Seizures.  This patient is a 59-year-old right-handed male returns for follow up for seizures.   He had a history of right-sided meningioma, status post resection in 01/2014. He had an intracerebral hemorrhage over the left side of the brain. The patient is accompanied by his friend Maribeth in the clinic today.  Since the last visit, he had no seizures.  He is now on Lamictal 300-400 and Zonegran 300 qhs.  Hie was having seizures once every 5-6 weeks before started on Zonegran.  It seemed that his seizures were controlled much better since started on Zonegran.  His last seizure was in July 2017. Patient is very happy with the seizure control. He is compliant with the seizure meds.    His seizures were described as tonic seizures, with probably LOC, which lasted for 20 sec each.    2. History of intracerebral hemorrhage status post evacuation of hematoma.   3. Status post endoscopic fenestration of the  left temporal horn cyst related to entrapment.   4. Hypertension.   5. Anxiety.   6. Right spastic hemiparesis.  Improved with Botox.    PLAN:   1. Continue Lamictal 300-400 mg and Zonegran 300mg qhs.  2. Return to clinic in 12 months.  If patient continue to have seizures, options in the future are a. Increase Lamictal or Zonegran, b. Add Vimpat.    25 min was spent on the visit.  Over 50% of the time was spent on education, counseling about optimal seizure control and coordination of care.    Again, thank you for allowing me to participate in the care of your patient.      Sincerely,  Rishi Hirsch MD

## 2019-07-25 NOTE — PROGRESS NOTES
CHIEF COMPLAINT: Seizures.    HISTORY OF PRESENT ILLNESS: This patient is a 59-year-old right-handed male returns for follow up for seizures.   He had a history of right-sided meningioma, status post resection in 01/2014. He had an intracerebral hemorrhage over the left side of the brain. The patient is accompanied by his friend Maribeth in the clinic today.  Since the last visit, he had no seizures.  He is now on Lamictal 300-400 and Zonegran 300 qhs.  Samira was having seizures once every 5-6 weeks before started on Zonegran.  It seemed that his seizures were controlled much better since started on Zonegran.  His last seizure was in July 2017. Patient is very happy with the seizure control. He is compliant with the seizure meds.    His seizures were described as tonic seizures, with probably LOC, which lasted for 20 sec each.    He was stried on Depakote recently due to increased seizure frequency.  Unfortunately, he experienced significant side effects from Depakote.  He was having significant hallucinations for the past two weeks, and Depakote had to be discontinued.    He had Botox injection for his right sided spasticity.  His spasticity improved.    According to the patient, he had a history of right-sided meningioma, status post resection in 01/2014. He had an intracerebral hemorrhage over the left side of the brain. He underwent a craniotomy for intraparenchymal hematoma on 01/02/2014. He had one seizure at the time of the hemorrhage. The description of the seizure is unclear.   In 11/2014, he had a repeat CT scan which showed dilatation of the left temporal horn related to entrapment. On 11/12/2014, Dr. Sudarshan Plummer performed an endoscopic fenestration of the cyst, and he was found to have seizures in the hospital. The patient reported that he had a total of four seizures since 11/2014. Three were generalized tonic-clonic seizures. One was probably simple partial seizure. The simple partial seizure was right  arm tonic posturing with no loss of consciousness. He remembered those and the generalized tonic-clonic seizures started with right hand tonic posturing and then progressed to generalized tonic-clonic movement. He reported that two of the seizures were witnessed. One was witnessed in the hospital and one was witnessed by his sister.     TRIGGERS FOR SEIZURES: Unclear except missing medications.   RISK FACTORS FOR SEIZURES: Left intraparenchymal hemorrhage of the brain in 01/2014. He had no history of head trauma with loss of consciousness. No history of CNS infection. No history of febrile convulsions.   PAST MEDICAL HISTORY: Cerebral intraparenchymal hemorrhage with hematoma, aneurysm basilar tip, right spastic hemiparesis, hypertension, seizures.   PAST SURGICAL HISTORY: Craniotomy with evacuation of hematoma subdural endoscopy, optical tracking system, ventriculostomy.     Current Outpatient Medications   Medication Sig Dispense Refill     acetaminophen (TYLENOL) 325 MG tablet Take 2 tablets (650 mg) by mouth every 6 hours as needed for mild pain       atorvastatin (LIPITOR) 10 MG tablet Take 1 tablet (10 mg) by mouth daily       IBUPROFEN PO Take 1-2 tablets by mouth every 6 hours as needed for moderate pain       lamoTRIgine (LAMICTAL) 100 MG tablet Take 3 tabs (300 mg) in the morning and 4 tabs in the evening. 630 tablet 3     sertraline (ZOLOFT) 100 MG tablet Take 200 mg by mouth        zonisamide (ZONEGRAN) 100 MG capsule Take 3 tabs (300 mg) at hs 270 capsule 3     carvedilol (COREG) 6.25 MG tablet Take 1 tablet (6.25 mg) by mouth 2 times daily (Patient not taking: Reported on 7/25/2019) 60 tablet 0        ALLERGIES: No known drug allergy.   FAMILY HISTORY: No family history of seizures.   SOCIAL HISTORY: He is single, living by himself. Smokes one pack every five days cigarettes. No alcohol use. No drug abuse.   REVIEW OF SYSTEMS: Right-sided weakness and spasticity. Anxiety and seizures. The rest of the  10-point review of systems is negative.     PHYSICAL EXAMINATION:   Blood pressure (!) 88/57, pulse 60.    HEENT: Normocephalic, atraumatic.   NECK: Supple.   EXTREMITIES: No edema.     NEUROLOGIC EXAM: Alert and oriented x3. Speech fluent, appropriate.   CRANIAL NERVES: Pupils are equal, round, reactive to light and accommodation. Extraocular movement intact. No facial weakness.  MOTOR EXAM: Normal bulk, increased tone in right upper and right lower extremities. Strength is 4/5 over the right upper and lower extremities and 5/5 over the left upper and lower extremities.   COORDINATION: Finger-to-nose, heel-to-shin examinations were normal on the left side. The patient was not able to perform over the right side.   SENSORY: Decreased sensation over the right upper and lower extremities.   GAIT: Spastic hemiparetic gait with the assistance of a cane.     PREVIOUS DIAGNOSTIC TESTING: CT of the head on 04/17/2015 showed slight interval decrease in dilated left temporal cyst and the dilated left temporal horn since the prior exam, previous left-sided craniotomy and the white matter changes in the left hemisphere again noted. MRI scan on 10/07/2014 showed trapped left lateral ventricle with cystic dilatation and adjacent vasogenic edema causing mass effect, unchanged. MRI scan on 01/02/2014 showed increased size of a hyperacute to acute intraparenchymal hemorrhage in the left posterior frontoparietal region with hemorrhage extending into an adjacent area of preexisting encephalomalacia as well as the left lateral ventricle. There is new blood extending into the right lateral ventricle occipital horn and possibly the fourth ventricle. Acute subarachnoid hemorrhage is again seen over the left parietal and the temporal lobe and insula with possible new subarachnoid blood over the cerebellar folia. There are two regions of enhancement along the anteromedial and anterolateral hemorrhage cavity which may represent sites of  residual or recurrent tumor. Comparison with prior image would likely be helpful.   EEG on of 12/01/2014 showed abnormal. There is evidence of focal cerebral dysfunction over the left hemisphere, especially the left posterior hemisphere. This is consistent with focal cerebral dysfunction in this region.   EEG on 01/03/2014 showed abnormal EEG due to the presence of mild diffuse slowing consistent with mild diffuse encephalopathy as well as additional severe slowing over the left temporal parietal head region consistent with the patient's history of subdural hematoma in this region.     Component      Latest Ref Rng & Units 10/17/2016 4/3/2017   Lamotrigine Level       10.1 12.4   Zonisamide Level Quant        17.3       IMPRESSION:   1. Seizures.  This patient is a 59-year-old right-handed male returns for follow up for seizures.   He had a history of right-sided meningioma, status post resection in 01/2014. He had an intracerebral hemorrhage over the left side of the brain. The patient is accompanied by his friend Maribeth in the clinic today.  Since the last visit, he had no seizures.  He is now on Lamictal 300-400 and Zonegran 300 qhs.  Hie was having seizures once every 5-6 weeks before started on Zonegran.  It seemed that his seizures were controlled much better since started on Zonegran.  His last seizure was in July 2017. Patient is very happy with the seizure control. He is compliant with the seizure meds.    His seizures were described as tonic seizures, with probably LOC, which lasted for 20 sec each.    2. History of intracerebral hemorrhage status post evacuation of hematoma.   3. Status post endoscopic fenestration of the left temporal horn cyst related to entrapment.   4. Hypertension.   5. Anxiety.   6. Right spastic hemiparesis.  Improved with Botox.    PLAN:   1. Continue Lamictal 300-400 mg and Zonegran 300mg qhs.  2. Return to clinic in 12 months.  If patient continue to have seizures, options in  the future are a. Increase Lamictal or Zonegran, b. Add Vimpat.        25 min was spent on the visit.  Over 50% of the time was spent on education, counseling about optimal seizure control and coordination of care.

## 2019-08-23 DIAGNOSIS — G40.909 SEIZURE DISORDER (H): ICD-10-CM

## 2019-08-27 RX ORDER — ZONISAMIDE 100 MG/1
CAPSULE ORAL
Qty: 270 CAPSULE | Refills: 3 | OUTPATIENT
Start: 2019-08-27

## 2019-08-27 NOTE — TELEPHONE ENCOUNTER
Refill refused.  Refilled 7/25/2019  See below for details:-    zonisamide (ZONEGRAN) 100 MG capsule 270 capsule 3 7/25/2019  No   Sig: Take 3 tabs (300 mg) at hs   Sent to pharmacy as: zonisamide (ZONEGRAN) 100 MG capsule   Class: E-Prescribe   Order: [id number]   E-Prescribing Status: Receipt confirmed by pharmacy (7/25/2019  4:31 PM CDT)

## 2019-11-03 ENCOUNTER — HEALTH MAINTENANCE LETTER (OUTPATIENT)
Age: 59
End: 2019-11-03

## 2020-02-10 ENCOUNTER — HEALTH MAINTENANCE LETTER (OUTPATIENT)
Age: 60
End: 2020-02-10

## 2020-05-19 DIAGNOSIS — G40.909 SEIZURE DISORDER (H): ICD-10-CM

## 2020-05-21 DIAGNOSIS — G40.909 SEIZURE DISORDER (H): ICD-10-CM

## 2020-05-21 DIAGNOSIS — R56.9 CONVULSIONS, UNSPECIFIED CONVULSION TYPE (H): ICD-10-CM

## 2020-05-21 RX ORDER — LAMOTRIGINE 100 MG/1
TABLET ORAL
Qty: 630 TABLET | Refills: 0 | Status: SHIPPED | OUTPATIENT
Start: 2020-05-21 | End: 2020-07-23

## 2020-05-21 RX ORDER — ZONISAMIDE 100 MG/1
CAPSULE ORAL
Qty: 270 CAPSULE | Refills: 0 | Status: SHIPPED | OUTPATIENT
Start: 2020-05-21 | End: 2020-07-23

## 2020-05-21 RX ORDER — ZONISAMIDE 100 MG/1
CAPSULE ORAL
Qty: 270 CAPSULE | Refills: 3 | OUTPATIENT
Start: 2020-05-21

## 2020-05-21 NOTE — TELEPHONE ENCOUNTER
Previous note indicates meds sent to wrong pharmacy - an apprant change in pharmacies as it originally went out on 7/25/19 for year supply and only now has become the wrong pharmacy.    Scripts amended to remaining refill (one 3 month supply with out refills) sent to new pharmacy.      Placed call to patient to inform, but phone went dead while ringing.

## 2020-05-28 ENCOUNTER — MYC MEDICAL ADVICE (OUTPATIENT)
Dept: NEUROLOGY | Facility: CLINIC | Age: 60
End: 2020-05-28

## 2020-07-23 ENCOUNTER — VIRTUAL VISIT (OUTPATIENT)
Dept: NEUROLOGY | Facility: CLINIC | Age: 60
End: 2020-07-23
Payer: MEDICARE

## 2020-07-23 DIAGNOSIS — R56.9 CONVULSIONS, UNSPECIFIED CONVULSION TYPE (H): ICD-10-CM

## 2020-07-23 DIAGNOSIS — G40.909 SEIZURE DISORDER (H): ICD-10-CM

## 2020-07-23 RX ORDER — LAMOTRIGINE 100 MG/1
TABLET ORAL
Qty: 630 TABLET | Refills: 3 | Status: SHIPPED | OUTPATIENT
Start: 2020-07-23 | End: 2021-07-15

## 2020-07-23 RX ORDER — ZONISAMIDE 100 MG/1
CAPSULE ORAL
Qty: 270 CAPSULE | Refills: 3 | Status: SHIPPED | OUTPATIENT
Start: 2020-07-23 | End: 2021-07-15

## 2020-07-23 NOTE — PROGRESS NOTES
"Garcia Canada is a 60 year old male who is being evaluated via a billable video visit.      The patient has been notified of following:     \"This video visit will be conducted via a call between you and your physician/provider. We have found that certain health care needs can be provided without the need for an in-person physical exam.  This service lets us provide the care you need with a video conversation.  If a prescription is necessary we can send it directly to your pharmacy.  If lab work is needed we can place an order for that and you can then stop by our lab to have the test done at a later time.    Video visits are billed at different rates depending on your insurance coverage.  Please reach out to your insurance provider with any questions.    If during the course of the call the physician/provider feels a video visit is not appropriate, you will not be charged for this service.\"    Patient has given verbal consent for Video visit? Yes  How would you like to obtain your AVS? [email or web address]  If you are dropped from the video visit, the video invite should be resent to: Send to e-mail at: [employer] Will anyone else be joining your video visit? Yes: renetta agent . How would they like to receive their invitation? Send to e-mail at: [employer]        CHIEF COMPLAINT: Seizures.     HISTORY OF PRESENT ILLNESS:  Video conference for follow up.  This patient is a 60-year-old right-handed male returns for follow up for seizures.  He had a history of right-sided meningioma, status post resection in 01/2014. He had an intracerebral hemorrhage over the left side of the brain. His friend Maribeth is also in the video call today.  Since the last visit, he had one seizure, likely a focal impaired seizure due to his anxiety medication which was given by his PMD.  Then he stopped the medication.  He did not miss any medication. He is now on Lamictal 300-400 and Zonegran 300 qhs.  Hie was having seizures once every 5-6 weeks " before started on Zonegran.  It seemed that his seizures were controlled much better since started on Zonegran.  His last seizure before this was in July 2017. Patient is happy with the seizure control. He is compliant with the seizure meds.     His seizures were described as tonic seizures, with probably LOC, which lasted for 20 sec each.     He was stried on Depakote recently due to increased seizure frequency.  Unfortunately, he experienced significant side effects from Depakote.  He was having significant hallucinations for the past two weeks, and Depakote had to be discontinued.     He had Botox injection for his right sided spasticity.  His spasticity improved.     According to the patient, he had a history of right-sided meningioma, status post resection in 01/2014. He had an intracerebral hemorrhage over the left side of the brain. He underwent a craniotomy for intraparenchymal hematoma on 01/02/2014. He had one seizure at the time of the hemorrhage. The description of the seizure is unclear.   In 11/2014, he had a repeat CT scan which showed dilatation of the left temporal horn related to entrapment. On 11/12/2014, Dr. Sudarshan Plummer performed an endoscopic fenestration of the cyst, and he was found to have seizures in the hospital. The patient reported that he had a total of four seizures since 11/2014. Three were generalized tonic-clonic seizures. One was probably simple partial seizure. The simple partial seizure was right arm tonic posturing with no loss of consciousness. He remembered those and the generalized tonic-clonic seizures started with right hand tonic posturing and then progressed to generalized tonic-clonic movement. He reported that two of the seizures were witnessed. One was witnessed in the hospital and one was witnessed by his sister.      TRIGGERS FOR SEIZURES: Unclear except missing medications.   RISK FACTORS FOR SEIZURES: Left intraparenchymal hemorrhage of the brain in 01/2014. He had  no history of head trauma with loss of consciousness. No history of CNS infection. No history of febrile convulsions.   PAST MEDICAL HISTORY: Cerebral intraparenchymal hemorrhage with hematoma, aneurysm basilar tip, right spastic hemiparesis, hypertension, seizures.   PAST SURGICAL HISTORY: Craniotomy with evacuation of hematoma subdural endoscopy, optical tracking system, ventriculostomy.        Current Outpatient Medications   Medication Sig Dispense Refill     acetaminophen (TYLENOL) 325 MG tablet Take 2 tablets (650 mg) by mouth every 6 hours as needed for mild pain       atorvastatin (LIPITOR) 10 MG tablet Take 1 tablet (10 mg) by mouth daily       IBUPROFEN PO Take 1-2 tablets by mouth every 6 hours as needed for moderate pain       lamoTRIgine (LAMICTAL) 100 MG tablet Take 3 tabs (300 mg) in the morning and 4 tabs in the evening. 630 tablet 0     sertraline (ZOLOFT) 100 MG tablet Take 200 mg by mouth        zonisamide (ZONEGRAN) 100 MG capsule Take 3 tabs (300 mg) at hs 270 capsule 0     carvedilol (COREG) 6.25 MG tablet Take 1 tablet (6.25 mg) by mouth 2 times daily (Patient not taking: Reported on 7/25/2019) 60 tablet 0        ALLERGIES: No known drug allergy.   FAMILY HISTORY: No family history of seizures.   SOCIAL HISTORY: He is single, living by himself. Smokes one pack every five days cigarettes. No alcohol use. No drug abuse.   REVIEW OF SYSTEMS: Right-sided weakness and spasticity. Anxiety and seizures. The rest of the 10-point review of systems is negative.      PHYSICAL EXAMINATION:     AAO x 3, speech fluent and appropriate. No facial weakness.     PREVIOUS DIAGNOSTIC TESTING: CT of the head on 04/17/2015 showed slight interval decrease in dilated left temporal cyst and the dilated left temporal horn since the prior exam, previous left-sided craniotomy and the white matter changes in the left hemisphere again noted. MRI scan on 10/07/2014 showed trapped left lateral ventricle with cystic  dilatation and adjacent vasogenic edema causing mass effect, unchanged. MRI scan on 01/02/2014 showed increased size of a hyperacute to acute intraparenchymal hemorrhage in the left posterior frontoparietal region with hemorrhage extending into an adjacent area of preexisting encephalomalacia as well as the left lateral ventricle. There is new blood extending into the right lateral ventricle occipital horn and possibly the fourth ventricle. Acute subarachnoid hemorrhage is again seen over the left parietal and the temporal lobe and insula with possible new subarachnoid blood over the cerebellar folia. There are two regions of enhancement along the anteromedial and anterolateral hemorrhage cavity which may represent sites of residual or recurrent tumor. Comparison with prior image would likely be helpful.   EEG on of 12/01/2014 showed abnormal. There is evidence of focal cerebral dysfunction over the left hemisphere, especially the left posterior hemisphere. This is consistent with focal cerebral dysfunction in this region.   EEG on 01/03/2014 showed abnormal EEG due to the presence of mild diffuse slowing consistent with mild diffuse encephalopathy as well as additional severe slowing over the left temporal parietal head region consistent with the patient's history of subdural hematoma in this region.      Component      Latest Ref Rng & Units 10/17/2016 4/3/2017   Lamotrigine Level       10.1 12.4   Zonisamide Level Quant         17.3         IMPRESSION:   1. Seizures. Since the last visit, he had one seizure, likely a focal impaired seizure due to his anxiety medication which was given by his PMD.  Then he stopped the medication.  He did not miss any medication. He is now on Lamictal 300-400 and Zonegran 300 qhs.  Hie was having seizures once every 5-6 weeks before started on Zonegran.  It seemed that his seizures were controlled much better since started on Zonegran.  His last seizure before this was in July  2017. Patient is happy with the seizure control. He is compliant with the seizure meds.     His seizures were described as tonic seizures, with probably LOC, which lasted for 20 sec each.     2. History of intracerebral hemorrhage status post evacuation of hematoma.   3. Status post endoscopic fenestration of the left temporal horn cyst related to entrapment.   4. Hypertension.   5. Anxiety.   6. Right spastic hemiparesis.  Improved with Botox.     PLAN:   1. Continue Lamictal 300-400 mg and Zonegran 300mg qhs.  2. Return to clinic in 12 months.  If patient continue to have seizures, options in the future are a. Increase Lamictal or Zonegran, b. Add Vimpat.       Video-Visit Details    Type of service:  Video Visit    Video Start Time: 11:16 AM  Video End Time: 11:34 AM    Originating Location (pt. Location): Home    Distant Location (provider location):  [facility]     Platform used for Video Visit: [employer].      18 min was spent on the visit.  Over 50% of the time was spent on education, counseling about optimal seizure control and coordination of care.      Rishi Hirsch MD

## 2020-07-23 NOTE — LETTER
2020     RE: Garcia Canada  : 1960   MRN: [id number]      Dear Colleague,    Thank you for referring your patient, Garcia Canada, to the [facility] at [facility]. Please see a copy of my visit note below.    Garcia Canada is a 60 year old male who is being evaluated via a billable video visit.      CHIEF COMPLAINT: Seizures.     HISTORY OF PRESENT ILLNESS:  Video conference for follow up.  This patient is a 60-year-old right-handed male returns for follow up for seizures.  He had a history of right-sided meningioma, status post resection in 2014. He had an intracerebral hemorrhage over the left side of the brain. His friend Maribeth is also in the video call today.  Since the last visit, he had one seizure, likely a focal impaired seizure due to his anxiety medication which was given by his PMD.  Then he stopped the medication.  He did not miss any medication. He is now on Lamictal 300-400 and Zonegran 300 qhs.  Hie was having seizures once every 5-6 weeks before started on Zonegran.  It seemed that his seizures were controlled much better since started on Zonegran.  His last seizure before this was in 2017. Patient is happy with the seizure control. He is compliant with the seizure meds.     His seizures were described as tonic seizures, with probably LOC, which lasted for 20 sec each.     He was stried on Depakote recently due to increased seizure frequency.  Unfortunately, he experienced significant side effects from Depakote.  He was having significant hallucinations for the past two weeks, and Depakote had to be discontinued.     He had Botox injection for his right sided spasticity.  His spasticity improved.     According to the patient, he had a history of right-sided meningioma, status post resection in 2014. He had an intracerebral hemorrhage over the left side of the brain. He underwent a craniotomy for intraparenchymal hematoma  on 01/02/2014. He had one seizure at the time of the hemorrhage. The description of the seizure is unclear.   In 11/2014, he had a repeat CT scan which showed dilatation of the left temporal horn related to entrapment. On 11/12/2014, Dr. Sudarshan Plummer performed an endoscopic fenestration of the cyst, and he was found to have seizures in the hospital. The patient reported that he had a total of four seizures since 11/2014. Three were generalized tonic-clonic seizures. One was probably simple partial seizure. The simple partial seizure was right arm tonic posturing with no loss of consciousness. He remembered those and the generalized tonic-clonic seizures started with right hand tonic posturing and then progressed to generalized tonic-clonic movement. He reported that two of the seizures were witnessed. One was witnessed in the hospital and one was witnessed by his sister.      TRIGGERS FOR SEIZURES: Unclear except missing medications.   RISK FACTORS FOR SEIZURES: Left intraparenchymal hemorrhage of the brain in 01/2014. He had no history of head trauma with loss of consciousness. No history of CNS infection. No history of febrile convulsions.   PAST MEDICAL HISTORY: Cerebral intraparenchymal hemorrhage with hematoma, aneurysm basilar tip, right spastic hemiparesis, hypertension, seizures.   PAST SURGICAL HISTORY: Craniotomy with evacuation of hematoma subdural endoscopy, optical tracking system, ventriculostomy.        Current Outpatient Medications   Medication Sig Dispense Refill     acetaminophen (TYLENOL) 325 MG tablet Take 2 tablets (650 mg) by mouth every 6 hours as needed for mild pain       atorvastatin (LIPITOR) 10 MG tablet Take 1 tablet (10 mg) by mouth daily       IBUPROFEN PO Take 1-2 tablets by mouth every 6 hours as needed for moderate pain       lamoTRIgine (LAMICTAL) 100 MG tablet Take 3 tabs (300 mg) in the morning and 4 tabs in the evening. 630 tablet 0     sertraline (ZOLOFT) 100 MG tablet Take  200 mg by mouth        zonisamide (ZONEGRAN) 100 MG capsule Take 3 tabs (300 mg) at hs 270 capsule 0     carvedilol (COREG) 6.25 MG tablet Take 1 tablet (6.25 mg) by mouth 2 times daily (Patient not taking: Reported on 7/25/2019) 60 tablet 0        ALLERGIES: No known drug allergy.   FAMILY HISTORY: No family history of seizures.   SOCIAL HISTORY: He is single, living by himself. Smokes one pack every five days cigarettes. No alcohol use. No drug abuse.   REVIEW OF SYSTEMS: Right-sided weakness and spasticity. Anxiety and seizures. The rest of the 10-point review of systems is negative.      PHYSICAL EXAMINATION:     AAO x 3, speech fluent and appropriate. No facial weakness.     PREVIOUS DIAGNOSTIC TESTING: CT of the head on 04/17/2015 showed slight interval decrease in dilated left temporal cyst and the dilated left temporal horn since the prior exam, previous left-sided craniotomy and the white matter changes in the left hemisphere again noted. MRI scan on 10/07/2014 showed trapped left lateral ventricle with cystic dilatation and adjacent vasogenic edema causing mass effect, unchanged. MRI scan on 01/02/2014 showed increased size of a hyperacute to acute intraparenchymal hemorrhage in the left posterior frontoparietal region with hemorrhage extending into an adjacent area of preexisting encephalomalacia as well as the left lateral ventricle. There is new blood extending into the right lateral ventricle occipital horn and possibly the fourth ventricle. Acute subarachnoid hemorrhage is again seen over the left parietal and the temporal lobe and insula with possible new subarachnoid blood over the cerebellar folia. There are two regions of enhancement along the anteromedial and anterolateral hemorrhage cavity which may represent sites of residual or recurrent tumor. Comparison with prior image would likely be helpful.   EEG on of 12/01/2014 showed abnormal. There is evidence of focal cerebral dysfunction over the  left hemisphere, especially the left posterior hemisphere. This is consistent with focal cerebral dysfunction in this region.   EEG on 01/03/2014 showed abnormal EEG due to the presence of mild diffuse slowing consistent with mild diffuse encephalopathy as well as additional severe slowing over the left temporal parietal head region consistent with the patient's history of subdural hematoma in this region.      Component      Latest Ref Rng & Units 10/17/2016 4/3/2017   Lamotrigine Level       10.1 12.4   Zonisamide Level Quant         17.3         IMPRESSION:   1. Seizures. Since the last visit, he had one seizure, likely a focal impaired seizure due to his anxiety medication which was given by his PMD.  Then he stopped the medication.  He did not miss any medication. He is now on Lamictal 300-400 and Zonegran 300 qhs.  Hie was having seizures once every 5-6 weeks before started on Zonegran.  It seemed that his seizures were controlled much better since started on Zonegran.  His last seizure before this was in July 2017. Patient is happy with the seizure control. He is compliant with the seizure meds.     His seizures were described as tonic seizures, with probably LOC, which lasted for 20 sec each.     2. History of intracerebral hemorrhage status post evacuation of hematoma.   3. Status post endoscopic fenestration of the left temporal horn cyst related to entrapment.   4. Hypertension.   5. Anxiety.   6. Right spastic hemiparesis.  Improved with Botox.     PLAN:   1. Continue Lamictal 300-400 mg and Zonegran 300mg qhs.  2. Return to clinic in 12 months.  If patient continue to have seizures, options in the future are a. Increase Lamictal or Zonegran, b. Add Vimpat.       Video-Visit Details  Type of service:  Video Visit  Video Start Time: 11:16 AM  Video End Time: 11:34 AM  Originating Location (pt. Location): Home  Distant Location (provider location):  [facility]   Platform used for Video Visit:  [employer].    18 min was spent on the visit.  Over 50% of the time was spent on education, counseling about optimal seizure control and coordination of care.    Rishi Hirsch MD

## 2020-11-16 ENCOUNTER — HEALTH MAINTENANCE LETTER (OUTPATIENT)
Age: 60
End: 2020-11-16

## 2021-04-03 ENCOUNTER — HEALTH MAINTENANCE LETTER (OUTPATIENT)
Age: 61
End: 2021-04-03

## 2021-07-15 ENCOUNTER — VIRTUAL VISIT (OUTPATIENT)
Dept: NEUROLOGY | Facility: CLINIC | Age: 61
End: 2021-07-15
Payer: MEDICARE

## 2021-07-15 DIAGNOSIS — G40.909 SEIZURE DISORDER (H): ICD-10-CM

## 2021-07-15 DIAGNOSIS — R56.9 CONVULSIONS, UNSPECIFIED CONVULSION TYPE (H): ICD-10-CM

## 2021-07-15 RX ORDER — ZONISAMIDE 100 MG/1
CAPSULE ORAL
Qty: 270 CAPSULE | Refills: 3 | Status: SHIPPED | OUTPATIENT
Start: 2021-07-15 | End: 2022-07-07

## 2021-07-15 RX ORDER — LAMOTRIGINE 100 MG/1
TABLET ORAL
Qty: 630 TABLET | Refills: 3 | Status: SHIPPED | OUTPATIENT
Start: 2021-07-15 | End: 2022-07-07

## 2021-07-15 NOTE — LETTER
7/15/2021     RE: Garcia Canada  : 1960   MRN: [id number]      Dear Colleague,    Thank you for referring your patient, Garcia Canada, to the [facility] at [facility]. Please see a copy of my visit note below.    Garcia is a 61 year old who is being evaluated via a billable telephone visit.      Time on the phone: 15 min    CHIEF COMPLAINT: Seizures.     HISTORY OF PRESENT ILLNESS:  Telephone call for follow up.  This patient is a 61-year-old right-handed male returns for follow up for seizures. He had a history of right-sided meningioma, status post resection in 2014. He had an intracerebral hemorrhage over the left side of the brain. His friend Maribeth is also on the phone.  Since the last visit, he had no seizures. His last seizure was at the beginning of , probably triggered by the anxiety medication his PMD gave him. He is now on Lamictal 300-400 and Zonegran 300 qhs.  Hie was having seizures once every 5-6 weeks before started on Zonegran.  It seemed that his seizures were controlled much better since started on Zonegran.  Patient is happy with the seizure control. He is compliant with the seizure meds.     His seizures were described as tonic seizures, with probably LOC, which lasted for 20 sec each.     He was stried on Depakote recently due to increased seizure frequency.  Unfortunately, he experienced significant side effects from Depakote.  He was having significant hallucinations for the past two weeks, and Depakote had to be discontinued.     He had Botox injection for his right sided spasticity.  His spasticity improved.     According to the patient, he had a history of right-sided meningioma, status post resection in 2014. He had an intracerebral hemorrhage over the left side of the brain. He underwent a craniotomy for intraparenchymal hematoma on 2014. He had one seizure at the time of the hemorrhage. The  description of the seizure is unclear.   In 11/2014, he had a repeat CT scan which showed dilatation of the left temporal horn related to entrapment. On 11/12/2014, Dr. Sudarshan Plummer performed an endoscopic fenestration of the cyst, and he was found to have seizures in the hospital. The patient reported that he had a total of four seizures since 11/2014. Three were generalized tonic-clonic seizures. One was probably simple partial seizure. The simple partial seizure was right arm tonic posturing with no loss of consciousness. He remembered those and the generalized tonic-clonic seizures started with right hand tonic posturing and then progressed to generalized tonic-clonic movement. He reported that two of the seizures were witnessed. One was witnessed in the hospital and one was witnessed by his sister.      TRIGGERS FOR SEIZURES: Unclear except missing medications.   RISK FACTORS FOR SEIZURES: Left intraparenchymal hemorrhage of the brain in 01/2014. He had no history of head trauma with loss of consciousness. No history of CNS infection. No history of febrile convulsions.   PAST MEDICAL HISTORY: Cerebral intraparenchymal hemorrhage with hematoma, aneurysm basilar tip, right spastic hemiparesis, hypertension, seizures.   PAST SURGICAL HISTORY: Craniotomy with evacuation of hematoma subdural endoscopy, optical tracking system, ventriculostomy.        Current Outpatient Medications   Medication Sig Dispense Refill     acetaminophen (TYLENOL) 325 MG tablet Take 2 tablets (650 mg) by mouth every 6 hours as needed for mild pain       atorvastatin (LIPITOR) 10 MG tablet Take 1 tablet (10 mg) by mouth daily       IBUPROFEN PO Take 1-2 tablets by mouth every 6 hours as needed for moderate pain       lamoTRIgine (LAMICTAL) 100 MG tablet Take 3 tabs (300 mg) in the morning and 4 tabs in the evening. 630 tablet 3     sertraline (ZOLOFT) 100 MG tablet Take 200 mg by mouth        zonisamide (ZONEGRAN) 100 MG capsule Take 3 tabs  (300 mg) at hs 270 capsule 3     carvedilol (COREG) 6.25 MG tablet Take 1 tablet (6.25 mg) by mouth 2 times daily (Patient not taking: Reported on 7/25/2019) 60 tablet 0        ALLERGIES: No known drug allergy.   FAMILY HISTORY: No family history of seizures.   SOCIAL HISTORY: He is single, living by himself. Smokes one pack every five days cigarettes. No alcohol use. No drug abuse.   REVIEW OF SYSTEMS: Right-sided weakness and spasticity. Anxiety and seizures. The rest of the 10-point review of systems is negative.      PHYSICAL EXAMINATION:     AAO x 3, speech fluent and appropriate. Hearing grossly normal.     PREVIOUS DIAGNOSTIC TESTING: CT of the head on 04/17/2015 showed slight interval decrease in dilated left temporal cyst and the dilated left temporal horn since the prior exam, previous left-sided craniotomy and the white matter changes in the left hemisphere again noted. MRI scan on 10/07/2014 showed trapped left lateral ventricle with cystic dilatation and adjacent vasogenic edema causing mass effect, unchanged. MRI scan on 01/02/2014 showed increased size of a hyperacute to acute intraparenchymal hemorrhage in the left posterior frontoparietal region with hemorrhage extending into an adjacent area of preexisting encephalomalacia as well as the left lateral ventricle. There is new blood extending into the right lateral ventricle occipital horn and possibly the fourth ventricle. Acute subarachnoid hemorrhage is again seen over the left parietal and the temporal lobe and insula with possible new subarachnoid blood over the cerebellar folia. There are two regions of enhancement along the anteromedial and anterolateral hemorrhage cavity which may represent sites of residual or recurrent tumor. Comparison with prior image would likely be helpful.    EEG on of 12/01/2014 showed abnormal. There is evidence of focal cerebral dysfunction over the left hemisphere, especially the left posterior hemisphere. This is  consistent with focal cerebral dysfunction in this region.    EEG on 01/03/2014 showed abnormal EEG due to the presence of mild diffuse slowing consistent with mild diffuse encephalopathy as well as additional severe slowing over the left temporal parietal head region consistent with the patient's history of subdural hematoma in this region.      Component      Latest Ref Rng & Units 10/17/2016 4/3/2017   Lamotrigine Level       10.1 12.4   Zonisamide Level Quant         17.3         IMPRESSION:   1. Seizures.   Since the last visit, he had no seizures. His last seizure was at the beginning of 2000, probably triggered by the anxiety medication his PMD gave him. He is now on Lamictal 300-400 and Zonegran 300 qhs.  Hie was having seizures once every 5-6 weeks before started on Zonegran.  It seemed that his seizures were controlled much better since started on Zonegran.  Patient is happy with the seizure control. He is compliant with the seizure meds.     His seizures were described as tonic seizures, with probably LOC, which lasted for 20 sec each.     2. History of intracerebral hemorrhage status post evacuation of hematoma.   3. Status post endoscopic fenestration of the left temporal horn cyst related to entrapment.   4. Hypertension.   5. Anxiety.   6. Right spastic hemiparesis.  Improved with Botox.     PLAN:   1. Continue Lamictal 300-400 mg and Zonegran 300mg qhs.  2. Return to clinic in 12 months.  If patient continue to have seizures, options in the future are a. Increase Lamictal or Zonegran, b. Add Vimpat.    Time on the phone: 12 min.    Again, thank you for allowing me to participate in the care of your patient.      Sincerely,    Rishi Hirsch MD

## 2021-07-15 NOTE — PROGRESS NOTES
Garcia is a 61 year old who is being evaluated via a billable telephone visit.      Time on the phone: 15 min

## 2021-07-15 NOTE — PROGRESS NOTES
CHIEF COMPLAINT: Seizures.     HISTORY OF PRESENT ILLNESS:  Telephone call for follow up.  This patient is a 61-year-old right-handed male returns for follow up for seizures. He had a history of right-sided meningioma, status post resection in 01/2014. He had an intracerebral hemorrhage over the left side of the brain. His friend Maribeth is also on the phone.  Since the last visit, he had no seizures. His last seizure was at the beginning of 2000, probably triggered by the anxiety medication his PMD gave him. He is now on Lamictal 300-400 and Zonegran 300 qhs.  Samira was having seizures once every 5-6 weeks before started on Zonegran.  It seemed that his seizures were controlled much better since started on Zonegran.  Patient is happy with the seizure control. He is compliant with the seizure meds.     His seizures were described as tonic seizures, with probably LOC, which lasted for 20 sec each.     He was stried on Depakote recently due to increased seizure frequency.  Unfortunately, he experienced significant side effects from Depakote.  He was having significant hallucinations for the past two weeks, and Depakote had to be discontinued.     He had Botox injection for his right sided spasticity.  His spasticity improved.     According to the patient, he had a history of right-sided meningioma, status post resection in 01/2014. He had an intracerebral hemorrhage over the left side of the brain. He underwent a craniotomy for intraparenchymal hematoma on 01/02/2014. He had one seizure at the time of the hemorrhage. The description of the seizure is unclear.   In 11/2014, he had a repeat CT scan which showed dilatation of the left temporal horn related to entrapment. On 11/12/2014, Dr. Sudarshan Plummer performed an endoscopic fenestration of the cyst, and he was found to have seizures in the hospital. The patient reported that he had a total of four seizures since 11/2014. Three were generalized tonic-clonic seizures. One  was probably simple partial seizure. The simple partial seizure was right arm tonic posturing with no loss of consciousness. He remembered those and the generalized tonic-clonic seizures started with right hand tonic posturing and then progressed to generalized tonic-clonic movement. He reported that two of the seizures were witnessed. One was witnessed in the hospital and one was witnessed by his sister.      TRIGGERS FOR SEIZURES: Unclear except missing medications.   RISK FACTORS FOR SEIZURES: Left intraparenchymal hemorrhage of the brain in 01/2014. He had no history of head trauma with loss of consciousness. No history of CNS infection. No history of febrile convulsions.   PAST MEDICAL HISTORY: Cerebral intraparenchymal hemorrhage with hematoma, aneurysm basilar tip, right spastic hemiparesis, hypertension, seizures.   PAST SURGICAL HISTORY: Craniotomy with evacuation of hematoma subdural endoscopy, optical tracking system, ventriculostomy.        Current Outpatient Medications   Medication Sig Dispense Refill     acetaminophen (TYLENOL) 325 MG tablet Take 2 tablets (650 mg) by mouth every 6 hours as needed for mild pain       atorvastatin (LIPITOR) 10 MG tablet Take 1 tablet (10 mg) by mouth daily       IBUPROFEN PO Take 1-2 tablets by mouth every 6 hours as needed for moderate pain       lamoTRIgine (LAMICTAL) 100 MG tablet Take 3 tabs (300 mg) in the morning and 4 tabs in the evening. 630 tablet 3     sertraline (ZOLOFT) 100 MG tablet Take 200 mg by mouth        zonisamide (ZONEGRAN) 100 MG capsule Take 3 tabs (300 mg) at hs 270 capsule 3     carvedilol (COREG) 6.25 MG tablet Take 1 tablet (6.25 mg) by mouth 2 times daily (Patient not taking: Reported on 7/25/2019) 60 tablet 0        ALLERGIES: No known drug allergy.   FAMILY HISTORY: No family history of seizures.   SOCIAL HISTORY: He is single, living by himself. Smokes one pack every five days cigarettes. No alcohol use. No drug abuse.   REVIEW OF  SYSTEMS: Right-sided weakness and spasticity. Anxiety and seizures. The rest of the 10-point review of systems is negative.      PHYSICAL EXAMINATION:     AAO x 3, speech fluent and appropriate. Hearing grossly normal.     PREVIOUS DIAGNOSTIC TESTING: CT of the head on 04/17/2015 showed slight interval decrease in dilated left temporal cyst and the dilated left temporal horn since the prior exam, previous left-sided craniotomy and the white matter changes in the left hemisphere again noted. MRI scan on 10/07/2014 showed trapped left lateral ventricle with cystic dilatation and adjacent vasogenic edema causing mass effect, unchanged. MRI scan on 01/02/2014 showed increased size of a hyperacute to acute intraparenchymal hemorrhage in the left posterior frontoparietal region with hemorrhage extending into an adjacent area of preexisting encephalomalacia as well as the left lateral ventricle. There is new blood extending into the right lateral ventricle occipital horn and possibly the fourth ventricle. Acute subarachnoid hemorrhage is again seen over the left parietal and the temporal lobe and insula with possible new subarachnoid blood over the cerebellar folia. There are two regions of enhancement along the anteromedial and anterolateral hemorrhage cavity which may represent sites of residual or recurrent tumor. Comparison with prior image would likely be helpful.    EEG on of 12/01/2014 showed abnormal. There is evidence of focal cerebral dysfunction over the left hemisphere, especially the left posterior hemisphere. This is consistent with focal cerebral dysfunction in this region.    EEG on 01/03/2014 showed abnormal EEG due to the presence of mild diffuse slowing consistent with mild diffuse encephalopathy as well as additional severe slowing over the left temporal parietal head region consistent with the patient's history of subdural hematoma in this region.      Component      Latest Ref Rng & Units 10/17/2016  4/3/2017   Lamotrigine Level       10.1 12.4   Zonisamide Level Quant         17.3         IMPRESSION:   1. Seizures.   Since the last visit, he had no seizures. His last seizure was at the beginning of 2000, probably triggered by the anxiety medication his PMD gave him. He is now on Lamictal 300-400 and Zonegran 300 qhs.  Samira was having seizures once every 5-6 weeks before started on Zonegran.  It seemed that his seizures were controlled much better since started on Zonegran.  Patient is happy with the seizure control. He is compliant with the seizure meds.     His seizures were described as tonic seizures, with probably LOC, which lasted for 20 sec each.     2. History of intracerebral hemorrhage status post evacuation of hematoma.   3. Status post endoscopic fenestration of the left temporal horn cyst related to entrapment.   4. Hypertension.   5. Anxiety.   6. Right spastic hemiparesis.  Improved with Botox.     PLAN:   1. Continue Lamictal 300-400 mg and Zonegran 300mg qhs.  2. Return to clinic in 12 months.  If patient continue to have seizures, options in the future are a. Increase Lamictal or Zonegran, b. Add Vimpat.      Time on the phone: 12 min.

## 2021-09-18 ENCOUNTER — HEALTH MAINTENANCE LETTER (OUTPATIENT)
Age: 61
End: 2021-09-18

## 2022-04-30 ENCOUNTER — HEALTH MAINTENANCE LETTER (OUTPATIENT)
Age: 62
End: 2022-04-30

## 2022-07-07 ENCOUNTER — VIRTUAL VISIT (OUTPATIENT)
Dept: NEUROLOGY | Facility: CLINIC | Age: 62
End: 2022-07-07
Payer: MEDICARE

## 2022-07-07 VITALS — HEIGHT: 67 IN | WEIGHT: 192 LBS | BODY MASS INDEX: 30.13 KG/M2

## 2022-07-07 DIAGNOSIS — G40.909 SEIZURE DISORDER (H): ICD-10-CM

## 2022-07-07 DIAGNOSIS — R56.9 CONVULSIONS, UNSPECIFIED CONVULSION TYPE (H): ICD-10-CM

## 2022-07-07 DIAGNOSIS — G40.909 RECURRENT SEIZURES (H): Primary | ICD-10-CM

## 2022-07-07 RX ORDER — LAMOTRIGINE 100 MG/1
TABLET ORAL
Qty: 630 TABLET | Refills: 3 | Status: SHIPPED | OUTPATIENT
Start: 2022-07-07 | End: 2023-06-08

## 2022-07-07 RX ORDER — ZONISAMIDE 100 MG/1
CAPSULE ORAL
Qty: 270 CAPSULE | Refills: 3 | Status: SHIPPED | OUTPATIENT
Start: 2022-07-07 | End: 2023-06-08

## 2022-07-07 ASSESSMENT — PAIN SCALES - GENERAL: PAINLEVEL: NO PAIN (0)

## 2022-07-07 ASSESSMENT — PATIENT HEALTH QUESTIONNAIRE - PHQ9: SUM OF ALL RESPONSES TO PHQ QUESTIONS 1-9: 2

## 2022-07-07 NOTE — LETTER
2022     RE: Garcia Canada  : 1960   MRN: [id number]      Dear Colleague,    Thank you for referring your patient, Garcia Canada, to the [facility] at [facility]. Please see a copy of my visit note below.    Garcia is a 62 year old who is being evaluated via a billable video visit.      How would you like to obtain your AVS? MyChart  If the video visit is dropped, the invitation should be resent by: Text to cell phone: [phone]  Will anyone else be joining your video visit? Yes: Maribeth will be joining . How would they like to receive their invitation? Other e-mail: None      Samanta Friedman    Video-Visit Details    Video Start Time: 10:54 AM    Type of service:  Video Visit    Video End Time:11:14 AM    Originating Location (pt. Location): Home    Distant Location (provider location):  [facility]     Platform used for Video Visit: OneID    CHIEF COMPLAINT: Seizures.     HISTORY OF PRESENT ILLNESS:  Video call for follow up. His friend Maribeth was also with him with the video. This patient is a 62-year-old right-handed male returns for follow up for seizures. He had a history of right-sided meningioma, status post resection in 2014. He had an intracerebral hemorrhage over the left side of the brain. His friend Maribeth is also on the phone.  Since the last visit, he had no seizures. His last seizure was at the beginning of , probably triggered by the anxiety medication his PMD gave him. He is now on Lamictal 300-400 and Zonegran 300 qhs.  Hie was having seizures once every 5-6 weeks before started on Zonegran.  It seemed that his seizures were controlled much better since started on Zonegran.  Patient is happy with the seizure control. He is compliant with the seizure meds.     His seizures were described as tonic seizures, with probably LOC, which lasted for 20 sec each.     He was stried on Depakote recently due to  increased seizure frequency.  Unfortunately, he experienced significant side effects from Depakote.  He was having significant hallucinations for the past two weeks, and Depakote had to be discontinued.     He had Botox injection for his right sided spasticity.  His spasticity improved.     According to the patient, he had a history of right-sided meningioma, status post resection in 01/2014. He had an intracerebral hemorrhage over the left side of the brain. He underwent a craniotomy for intraparenchymal hematoma on 01/02/2014. He had one seizure at the time of the hemorrhage. The description of the seizure is unclear.   In 11/2014, he had a repeat CT scan which showed dilatation of the left temporal horn related to entrapment. On 11/12/2014, Dr. Sudarshan Plummer performed an endoscopic fenestration of the cyst, and he was found to have seizures in the hospital. The patient reported that he had a total of four seizures since 11/2014. Three were generalized tonic-clonic seizures. One was probably simple partial seizure. The simple partial seizure was right arm tonic posturing with no loss of consciousness. He remembered those and the generalized tonic-clonic seizures started with right hand tonic posturing and then progressed to generalized tonic-clonic movement. He reported that two of the seizures were witnessed. One was witnessed in the hospital and one was witnessed by his sister.      TRIGGERS FOR SEIZURES: Unclear except missing medications.   RISK FACTORS FOR SEIZURES: Left intraparenchymal hemorrhage of the brain in 01/2014. He had no history of head trauma with loss of consciousness. No history of CNS infection. No history of febrile convulsions.   PAST MEDICAL HISTORY: Cerebral intraparenchymal hemorrhage with hematoma, aneurysm basilar tip, right spastic hemiparesis, hypertension, seizures.   PAST SURGICAL HISTORY: Craniotomy with evacuation of hematoma subdural endoscopy, optical tracking system,  ventriculostomy.        Current Outpatient Medications   Medication Sig Dispense Refill     acetaminophen (TYLENOL) 325 MG tablet Take 2 tablets (650 mg) by mouth every 6 hours as needed for mild pain       atorvastatin (LIPITOR) 10 MG tablet Take 1 tablet (10 mg) by mouth daily       IBUPROFEN PO Take 1-2 tablets by mouth every 6 hours as needed for moderate pain       lamoTRIgine (LAMICTAL) 100 MG tablet Take 3 tabs (300 mg) in the morning and 4 tabs in the evening. 630 tablet 3     sertraline (ZOLOFT) 100 MG tablet Take 200 mg by mouth        zonisamide (ZONEGRAN) 100 MG capsule Take 3 tabs (300 mg) at hs 270 capsule 3     carvedilol (COREG) 6.25 MG tablet Take 1 tablet (6.25 mg) by mouth 2 times daily (Patient not taking: Reported on 7/7/2022) 60 tablet 0        ALLERGIES: No known drug allergy.   FAMILY HISTORY: No family history of seizures.   SOCIAL HISTORY: He is single, living by himself. Smokes one pack every five days cigarettes. No alcohol use. No drug abuse.   REVIEW OF SYSTEMS: Right-sided weakness and spasticity. Anxiety and seizures. The rest of the 10-point review of systems is negative.      PHYSICAL EXAMINATION:     AAO x 3, speech fluent and appropriate. Speech fluent and appropriate. Hearing grossly normal.     PREVIOUS DIAGNOSTIC TESTING: CT of the head on 04/17/2015 showed slight interval decrease in dilated left temporal cyst and the dilated left temporal horn since the prior exam, previous left-sided craniotomy and the white matter changes in the left hemisphere again noted. MRI scan on 10/07/2014 showed trapped left lateral ventricle with cystic dilatation and adjacent vasogenic edema causing mass effect, unchanged. MRI scan on 01/02/2014 showed increased size of a hyperacute to acute intraparenchymal hemorrhage in the left posterior frontoparietal region with hemorrhage extending into an adjacent area of preexisting encephalomalacia as well as the left lateral ventricle. There is new blood  extending into the right lateral ventricle occipital horn and possibly the fourth ventricle. Acute subarachnoid hemorrhage is again seen over the left parietal and the temporal lobe and insula with possible new subarachnoid blood over the cerebellar folia. There are two regions of enhancement along the anteromedial and anterolateral hemorrhage cavity which may represent sites of residual or recurrent tumor. Comparison with prior image would likely be helpful.    EEG on of 12/01/2014 showed abnormal. There is evidence of focal cerebral dysfunction over the left hemisphere, especially the left posterior hemisphere. This is consistent with focal cerebral dysfunction in this region.    EEG on 01/03/2014 showed abnormal EEG due to the presence of mild diffuse slowing consistent with mild diffuse encephalopathy as well as additional severe slowing over the left temporal parietal head region consistent with the patient's history of subdural hematoma in this region.      Component      Latest Ref Rng & Units 10/17/2016 4/3/2017   Lamotrigine Level       10.1 12.4   Zonisamide Level Quant         17.3         IMPRESSION:   1. Seizures.   Since the last visit, he had no seizures. His last seizure was at the beginning of 2020, probably triggered by the anxiety medication his PMD gave him. He is now on Lamictal 300-400 and Zonegran 300 qhs.  Hie was having seizures once every 5-6 weeks before started on Zonegran.  It seemed that his seizures were controlled much better since started on Zonegran.  Patient is happy with the seizure control. He is compliant with the seizure meds.     His seizures were described as tonic seizures, with probably LOC, which lasted for 20 sec each.     2. History of intracerebral hemorrhage status post evacuation of hematoma.   3. Status post endoscopic fenestration of the left temporal horn cyst related to entrapment.   4. Hypertension.   5. Anxiety.   6. Right spastic hemiparesis.  Improved with  Botox.     PLAN:   1. Continue Lamictal 300-400 mg and Zonegran 300mg qhs.  2. Return to clinic in 12 months.  If patient continue to have seizures, options in the future are a. Increase Lamictal or Zonegran, b. Add Vimpat.    20 min total time was spent on the day of this visit.    12 min was spent on face to face time  4 min was spent on preparation of visit to review charts and labs, ordering medications and tests  4 min was spent on documentation of clinical information    Again, thank you for allowing me to participate in the care of your patient.      Sincerely,    Rishi Hirsch MD

## 2022-07-07 NOTE — PROGRESS NOTES
Gracia is a 62 year old who is being evaluated via a billable video visit.      How would you like to obtain your AVS? MyChart  If the video visit is dropped, the invitation should be resent by: Text to cell phone: [phone]  Will anyone else be joining your video visit? Yes: Maribeth will be joining . How would they like to receive their invitation? Other e-mail: None      Samanta Friedman    Video-Visit Details    Video Start Time: 10:54 AM    Type of service:  Video Visit    Video End Time:11:14 AM    Originating Location (pt. Location): Home    Distant Location (provider location):  [facility]     Platform used for Video Visit: Jael

## 2022-07-07 NOTE — PROGRESS NOTES
CHIEF COMPLAINT: Seizures.     HISTORY OF PRESENT ILLNESS:  Video call for follow up. His friend Maribeth was also with him with the video. This patient is a 62-year-old right-handed male returns for follow up for seizures. He had a history of right-sided meningioma, status post resection in 01/2014. He had an intracerebral hemorrhage over the left side of the brain. His friend Maribeth is also on the phone.  Since the last visit, he had no seizures. His last seizure was at the beginning of 2020, probably triggered by the anxiety medication his PMD gave him. He is now on Lamictal 300-400 and Zonegran 300 qhs.  Hie was having seizures once every 5-6 weeks before started on Zonegran.  It seemed that his seizures were controlled much better since started on Zonegran.  Patient is happy with the seizure control. He is compliant with the seizure meds.     His seizures were described as tonic seizures, with probably LOC, which lasted for 20 sec each.     He was stried on Depakote recently due to increased seizure frequency.  Unfortunately, he experienced significant side effects from Depakote.  He was having significant hallucinations for the past two weeks, and Depakote had to be discontinued.     He had Botox injection for his right sided spasticity.  His spasticity improved.     According to the patient, he had a history of right-sided meningioma, status post resection in 01/2014. He had an intracerebral hemorrhage over the left side of the brain. He underwent a craniotomy for intraparenchymal hematoma on 01/02/2014. He had one seizure at the time of the hemorrhage. The description of the seizure is unclear.   In 11/2014, he had a repeat CT scan which showed dilatation of the left temporal horn related to entrapment. On 11/12/2014, Dr. Sudarshan Plummer performed an endoscopic fenestration of the cyst, and he was found to have seizures in the hospital. The patient reported that he had a total of four seizures since 11/2014.  Three were generalized tonic-clonic seizures. One was probably simple partial seizure. The simple partial seizure was right arm tonic posturing with no loss of consciousness. He remembered those and the generalized tonic-clonic seizures started with right hand tonic posturing and then progressed to generalized tonic-clonic movement. He reported that two of the seizures were witnessed. One was witnessed in the hospital and one was witnessed by his sister.      TRIGGERS FOR SEIZURES: Unclear except missing medications.   RISK FACTORS FOR SEIZURES: Left intraparenchymal hemorrhage of the brain in 01/2014. He had no history of head trauma with loss of consciousness. No history of CNS infection. No history of febrile convulsions.   PAST MEDICAL HISTORY: Cerebral intraparenchymal hemorrhage with hematoma, aneurysm basilar tip, right spastic hemiparesis, hypertension, seizures.   PAST SURGICAL HISTORY: Craniotomy with evacuation of hematoma subdural endoscopy, optical tracking system, ventriculostomy.        Current Outpatient Medications   Medication Sig Dispense Refill     acetaminophen (TYLENOL) 325 MG tablet Take 2 tablets (650 mg) by mouth every 6 hours as needed for mild pain       atorvastatin (LIPITOR) 10 MG tablet Take 1 tablet (10 mg) by mouth daily       IBUPROFEN PO Take 1-2 tablets by mouth every 6 hours as needed for moderate pain       lamoTRIgine (LAMICTAL) 100 MG tablet Take 3 tabs (300 mg) in the morning and 4 tabs in the evening. 630 tablet 3     sertraline (ZOLOFT) 100 MG tablet Take 200 mg by mouth        zonisamide (ZONEGRAN) 100 MG capsule Take 3 tabs (300 mg) at hs 270 capsule 3     carvedilol (COREG) 6.25 MG tablet Take 1 tablet (6.25 mg) by mouth 2 times daily (Patient not taking: Reported on 7/7/2022) 60 tablet 0        ALLERGIES: No known drug allergy.   FAMILY HISTORY: No family history of seizures.   SOCIAL HISTORY: He is single, living by himself. Smokes one pack every five days cigarettes.  No alcohol use. No drug abuse.   REVIEW OF SYSTEMS: Right-sided weakness and spasticity. Anxiety and seizures. The rest of the 10-point review of systems is negative.      PHYSICAL EXAMINATION:     AAO x 3, speech fluent and appropriate. Speech fluent and appropriate. Hearing grossly normal.     PREVIOUS DIAGNOSTIC TESTING: CT of the head on 04/17/2015 showed slight interval decrease in dilated left temporal cyst and the dilated left temporal horn since the prior exam, previous left-sided craniotomy and the white matter changes in the left hemisphere again noted. MRI scan on 10/07/2014 showed trapped left lateral ventricle with cystic dilatation and adjacent vasogenic edema causing mass effect, unchanged. MRI scan on 01/02/2014 showed increased size of a hyperacute to acute intraparenchymal hemorrhage in the left posterior frontoparietal region with hemorrhage extending into an adjacent area of preexisting encephalomalacia as well as the left lateral ventricle. There is new blood extending into the right lateral ventricle occipital horn and possibly the fourth ventricle. Acute subarachnoid hemorrhage is again seen over the left parietal and the temporal lobe and insula with possible new subarachnoid blood over the cerebellar folia. There are two regions of enhancement along the anteromedial and anterolateral hemorrhage cavity which may represent sites of residual or recurrent tumor. Comparison with prior image would likely be helpful.    EEG on of 12/01/2014 showed abnormal. There is evidence of focal cerebral dysfunction over the left hemisphere, especially the left posterior hemisphere. This is consistent with focal cerebral dysfunction in this region.    EEG on 01/03/2014 showed abnormal EEG due to the presence of mild diffuse slowing consistent with mild diffuse encephalopathy as well as additional severe slowing over the left temporal parietal head region consistent with the patient's history of subdural  hematoma in this region.      Component      Latest Ref Rng & Units 10/17/2016 4/3/2017   Lamotrigine Level       10.1 12.4   Zonisamide Level Quant         17.3         IMPRESSION:   1. Seizures.   Since the last visit, he had no seizures. His last seizure was at the beginning of 2020, probably triggered by the anxiety medication his PMD gave him. He is now on Lamictal 300-400 and Zonegran 300 qhs.  Hie was having seizures once every 5-6 weeks before started on Zonegran.  It seemed that his seizures were controlled much better since started on Zonegran.  Patient is happy with the seizure control. He is compliant with the seizure meds.     His seizures were described as tonic seizures, with probably LOC, which lasted for 20 sec each.     2. History of intracerebral hemorrhage status post evacuation of hematoma.   3. Status post endoscopic fenestration of the left temporal horn cyst related to entrapment.   4. Hypertension.   5. Anxiety.   6. Right spastic hemiparesis.  Improved with Botox.     PLAN:   1. Continue Lamictal 300-400 mg and Zonegran 300mg qhs.  2. Return to clinic in 12 months.  If patient continue to have seizures, options in the future are a. Increase Lamictal or Zonegran, b. Add Vimpat.      20 min total time was spent on the day of this visit.      12 min was spent on face to face time  4 min was spent on preparation of visit to review charts and labs, ordering medications and tests  4 min was spent on documentation of clinical information

## 2022-11-19 ENCOUNTER — HEALTH MAINTENANCE LETTER (OUTPATIENT)
Age: 62
End: 2022-11-19

## 2023-06-08 ENCOUNTER — VIRTUAL VISIT (OUTPATIENT)
Dept: NEUROLOGY | Facility: CLINIC | Age: 63
End: 2023-06-08
Payer: MEDICARE

## 2023-06-08 VITALS — WEIGHT: 200 LBS | HEIGHT: 66 IN | BODY MASS INDEX: 32.14 KG/M2

## 2023-06-08 DIAGNOSIS — R56.9 CONVULSIONS, UNSPECIFIED CONVULSION TYPE (H): ICD-10-CM

## 2023-06-08 DIAGNOSIS — G40.909 SEIZURE DISORDER (H): ICD-10-CM

## 2023-06-08 RX ORDER — ZONISAMIDE 100 MG/1
CAPSULE ORAL
Qty: 270 CAPSULE | Refills: 3 | Status: SHIPPED | OUTPATIENT
Start: 2023-06-08 | End: 2024-05-14

## 2023-06-08 RX ORDER — LAMOTRIGINE 100 MG/1
TABLET ORAL
Qty: 630 TABLET | Refills: 3 | Status: SHIPPED | OUTPATIENT
Start: 2023-06-08 | End: 2024-05-14

## 2023-06-08 ASSESSMENT — PAIN SCALES - GENERAL: PAINLEVEL: NO PAIN (0)

## 2023-06-08 ASSESSMENT — PATIENT HEALTH QUESTIONNAIRE - PHQ9: SUM OF ALL RESPONSES TO PHQ QUESTIONS 1-9: 14

## 2023-06-08 NOTE — PROGRESS NOTES
CHIEF COMPLAINT: Seizures.     HISTORY OF PRESENT ILLNESS:  Video call for follow up. His friend Maribeth was also with him with the video. This patient is a 63-year-old right-handed male returns for follow up for seizures. He had a history of right-sided meningioma, status post resection in 01/2014. He had an intracerebral hemorrhage over the left side of the brain. Since the last visit, he had no seizures. His last seizure was at the beginning of 2020, probably triggered by the anxiety medication his PMD gave him. He is now on Lamictal 300-400 and Zonegran 300 qhs.  Hijorge luis was having seizures once every 5-6 weeks before started on Zonegran.  It seemed that his seizures were controlled much better since started on Zonegran.  Patient is happy with the seizure control. He is compliant with the seizure meds.     His seizures were described as tonic seizures, with probably LOC, which lasted for 20 sec each.     He was stried on Depakote recently due to increased seizure frequency.  Unfortunately, he experienced significant side effects from Depakote.  He was having significant hallucinations for the past two weeks, and Depakote had to be discontinued.     He had Botox injection for his right sided spasticity.  His spasticity improved.     According to the patient, he had a history of right-sided meningioma, status post resection in 01/2014. He had an intracerebral hemorrhage over the left side of the brain. He underwent a craniotomy for intraparenchymal hematoma on 01/02/2014. He had one seizure at the time of the hemorrhage. The description of the seizure is unclear.   In 11/2014, he had a repeat CT scan which showed dilatation of the left temporal horn related to entrapment. On 11/12/2014, Dr. Sudarshan Plummer performed an endoscopic fenestration of the cyst, and he was found to have seizures in the hospital. The patient reported that he had a total of four seizures since 11/2014. Three were generalized tonic-clonic  seizures. One was probably simple partial seizure. The simple partial seizure was right arm tonic posturing with no loss of consciousness. He remembered those and the generalized tonic-clonic seizures started with right hand tonic posturing and then progressed to generalized tonic-clonic movement. He reported that two of the seizures were witnessed. One was witnessed in the hospital and one was witnessed by his sister.      TRIGGERS FOR SEIZURES: Unclear except missing medications.   RISK FACTORS FOR SEIZURES: Left intraparenchymal hemorrhage of the brain in 01/2014. He had no history of head trauma with loss of consciousness. No history of CNS infection. No history of febrile convulsions.   PAST MEDICAL HISTORY: Cerebral intraparenchymal hemorrhage with hematoma, aneurysm basilar tip, right spastic hemiparesis, hypertension, seizures.   PAST SURGICAL HISTORY: Craniotomy with evacuation of hematoma subdural endoscopy, optical tracking system, ventriculostomy.        Current Outpatient Medications   Medication Sig Dispense Refill     acetaminophen (TYLENOL) 325 MG tablet Take 2 tablets (650 mg) by mouth every 6 hours as needed for mild pain       atorvastatin (LIPITOR) 10 MG tablet Take 1 tablet (10 mg) by mouth daily       IBUPROFEN PO Take 1-2 tablets by mouth every 6 hours as needed for moderate pain       lamoTRIgine (LAMICTAL) 100 MG tablet Take 3 tabs (300 mg) in the morning and 4 tabs in the evening. 630 tablet 3     sertraline (ZOLOFT) 100 MG tablet Take 200 mg by mouth        zonisamide (ZONEGRAN) 100 MG capsule Take 3 tabs (300 mg) at hs 270 capsule 3     carvedilol (COREG) 6.25 MG tablet Take 1 tablet (6.25 mg) by mouth 2 times daily (Patient not taking: Reported on 7/7/2022) 60 tablet 0        ALLERGIES: No known drug allergy.   FAMILY HISTORY: No family history of seizures.   SOCIAL HISTORY: He is single, living by himself. Smokes one pack every five days cigarettes. No alcohol use. No drug abuse.    REVIEW OF SYSTEMS: Right-sided weakness and spasticity. Anxiety and seizures. The rest of the 10-point review of systems is negative.      PHYSICAL EXAMINATION:     AAO x 3, speech fluent and appropriate. Speech fluent and appropriate. Hearing grossly normal.     PREVIOUS DIAGNOSTIC TESTING: CT of the head on 04/17/2015 showed slight interval decrease in dilated left temporal cyst and the dilated left temporal horn since the prior exam, previous left-sided craniotomy and the white matter changes in the left hemisphere again noted. MRI scan on 10/07/2014 showed trapped left lateral ventricle with cystic dilatation and adjacent vasogenic edema causing mass effect, unchanged. MRI scan on 01/02/2014 showed increased size of a hyperacute to acute intraparenchymal hemorrhage in the left posterior frontoparietal region with hemorrhage extending into an adjacent area of preexisting encephalomalacia as well as the left lateral ventricle. There is new blood extending into the right lateral ventricle occipital horn and possibly the fourth ventricle. Acute subarachnoid hemorrhage is again seen over the left parietal and the temporal lobe and insula with possible new subarachnoid blood over the cerebellar folia. There are two regions of enhancement along the anteromedial and anterolateral hemorrhage cavity which may represent sites of residual or recurrent tumor. Comparison with prior image would likely be helpful.    EEG on of 12/01/2014 showed abnormal. There is evidence of focal cerebral dysfunction over the left hemisphere, especially the left posterior hemisphere. This is consistent with focal cerebral dysfunction in this region.    EEG on 01/03/2014 showed abnormal EEG due to the presence of mild diffuse slowing consistent with mild diffuse encephalopathy as well as additional severe slowing over the left temporal parietal head region consistent with the patient's history of subdural hematoma in this region.       Component      Latest Ref Rng & Units 10/17/2016 4/3/2017   Lamotrigine Level       10.1 12.4   Zonisamide Level Quant         17.3         IMPRESSION:   1. Seizures.   Since the last visit, he had no seizures. His last seizure was at the beginning of 2020, probably triggered by the anxiety medication his PMD gave him. He is now on Lamictal 300-400 and Zonegran 300 qhs.  Hie was having seizures once every 5-6 weeks before started on Zonegran.  It seemed that his seizures were controlled much better since started on Zonegran.  Patient is happy with the seizure control. He is compliant with the seizure meds.     His seizures were described as tonic seizures, with probably LOC, which lasted for 20 sec each.     2. History of intracerebral hemorrhage status post evacuation of hematoma.   3. Status post endoscopic fenestration of the left temporal horn cyst related to entrapment.   4. Hypertension.   5. Anxiety.   6. Right spastic hemiparesis.  Improved with Botox.     PLAN:   1. Continue Lamictal 300-400 mg and Zonegran 300mg qhs.  2. Labs: Lamictal and Zonegran.  3. Return to clinic in 12 months.  If patient continue to have seizures, options in the future are a. Increase Lamictal or Zonegran, b. Add Vimpat.      20 min total time was spent on the day of this visit.      10 min was spent on face to face time  10 min was spent on preparation of visit to review charts and labs, ordering medications and tests, and documentation of clinical information

## 2023-06-08 NOTE — LETTER
2023       RE: Garcia Canada  : 1960   MRN: [id number]      ,    Thank you for referring your patient, Garcia Canada, to the [facility], [place], operated by [facility] at [facility]. Please see a copy of my visit note below.      CHIEF COMPLAINT: Seizures.     HISTORY OF PRESENT ILLNESS:  Video call for follow up. His friend Maribeth was also with him with the video. This patient is a 63-year-old right-handed male returns for follow up for seizures. He had a history of right-sided meningioma, status post resection in 2014. He had an intracerebral hemorrhage over the left side of the brain. Since the last visit, he had no seizures. His last seizure was at the beginning of , probably triggered by the anxiety medication his PMD gave him. He is now on Lamictal 300-400 and Zonegran 300 qhs.  Hie was having seizures once every 5-6 weeks before started on Zonegran.  It seemed that his seizures were controlled much better since started on Zonegran.  Patient is happy with the seizure control. He is compliant with the seizure meds.     His seizures were described as tonic seizures, with probably LOC, which lasted for 20 sec each.     He was stried on Depakote recently due to increased seizure frequency.  Unfortunately, he experienced significant side effects from Depakote.  He was having significant hallucinations for the past two weeks, and Depakote had to be discontinued.     He had Botox injection for his right sided spasticity.  His spasticity improved.     According to the patient, he had a history of right-sided meningioma, status post resection in 2014. He had an intracerebral hemorrhage over the left side of the brain. He underwent a craniotomy for intraparenchymal hematoma on 2014. He had one seizure at the time of the hemorrhage. The description of the seizure is unclear.   In 2014, he had a repeat CT scan which showed dilatation of the left  temporal horn related to entrapment. On 11/12/2014, Dr. Sudarshan Plummer performed an endoscopic fenestration of the cyst, and he was found to have seizures in the hospital. The patient reported that he had a total of four seizures since 11/2014. Three were generalized tonic-clonic seizures. One was probably simple partial seizure. The simple partial seizure was right arm tonic posturing with no loss of consciousness. He remembered those and the generalized tonic-clonic seizures started with right hand tonic posturing and then progressed to generalized tonic-clonic movement. He reported that two of the seizures were witnessed. One was witnessed in the hospital and one was witnessed by his sister.      TRIGGERS FOR SEIZURES: Unclear except missing medications.   RISK FACTORS FOR SEIZURES: Left intraparenchymal hemorrhage of the brain in 01/2014. He had no history of head trauma with loss of consciousness. No history of CNS infection. No history of febrile convulsions.   PAST MEDICAL HISTORY: Cerebral intraparenchymal hemorrhage with hematoma, aneurysm basilar tip, right spastic hemiparesis, hypertension, seizures.   PAST SURGICAL HISTORY: Craniotomy with evacuation of hematoma subdural endoscopy, optical tracking system, ventriculostomy.        Current Outpatient Medications   Medication Sig Dispense Refill    acetaminophen (TYLENOL) 325 MG tablet Take 2 tablets (650 mg) by mouth every 6 hours as needed for mild pain      atorvastatin (LIPITOR) 10 MG tablet Take 1 tablet (10 mg) by mouth daily      IBUPROFEN PO Take 1-2 tablets by mouth every 6 hours as needed for moderate pain      lamoTRIgine (LAMICTAL) 100 MG tablet Take 3 tabs (300 mg) in the morning and 4 tabs in the evening. 630 tablet 3    sertraline (ZOLOFT) 100 MG tablet Take 200 mg by mouth       zonisamide (ZONEGRAN) 100 MG capsule Take 3 tabs (300 mg) at hs 270 capsule 3    carvedilol (COREG) 6.25 MG tablet Take 1 tablet (6.25 mg) by mouth 2 times daily  (Patient not taking: Reported on 7/7/2022) 60 tablet 0        ALLERGIES: No known drug allergy.   FAMILY HISTORY: No family history of seizures.   SOCIAL HISTORY: He is single, living by himself. Smokes one pack every five days cigarettes. No alcohol use. No drug abuse.   REVIEW OF SYSTEMS: Right-sided weakness and spasticity. Anxiety and seizures. The rest of the 10-point review of systems is negative.      PHYSICAL EXAMINATION:     AAO x 3, speech fluent and appropriate. Speech fluent and appropriate. Hearing grossly normal.     PREVIOUS DIAGNOSTIC TESTING: CT of the head on 04/17/2015 showed slight interval decrease in dilated left temporal cyst and the dilated left temporal horn since the prior exam, previous left-sided craniotomy and the white matter changes in the left hemisphere again noted. MRI scan on 10/07/2014 showed trapped left lateral ventricle with cystic dilatation and adjacent vasogenic edema causing mass effect, unchanged. MRI scan on 01/02/2014 showed increased size of a hyperacute to acute intraparenchymal hemorrhage in the left posterior frontoparietal region with hemorrhage extending into an adjacent area of preexisting encephalomalacia as well as the left lateral ventricle. There is new blood extending into the right lateral ventricle occipital horn and possibly the fourth ventricle. Acute subarachnoid hemorrhage is again seen over the left parietal and the temporal lobe and insula with possible new subarachnoid blood over the cerebellar folia. There are two regions of enhancement along the anteromedial and anterolateral hemorrhage cavity which may represent sites of residual or recurrent tumor. Comparison with prior image would likely be helpful.    EEG on of 12/01/2014 showed abnormal. There is evidence of focal cerebral dysfunction over the left hemisphere, especially the left posterior hemisphere. This is consistent with focal cerebral dysfunction in this region.    EEG on 01/03/2014  showed abnormal EEG due to the presence of mild diffuse slowing consistent with mild diffuse encephalopathy as well as additional severe slowing over the left temporal parietal head region consistent with the patient's history of subdural hematoma in this region.      Component      Latest Ref Rng & Units 10/17/2016 4/3/2017   Lamotrigine Level       10.1 12.4   Zonisamide Level Quant         17.3         IMPRESSION:   1. Seizures.   Since the last visit, he had no seizures. His last seizure was at the beginning of 2020, probably triggered by the anxiety medication his PMD gave him. He is now on Lamictal 300-400 and Zonegran 300 qhs.  Hie was having seizures once every 5-6 weeks before started on Zonegran.  It seemed that his seizures were controlled much better since started on Zonegran.  Patient is happy with the seizure control. He is compliant with the seizure meds.     His seizures were described as tonic seizures, with probably LOC, which lasted for 20 sec each.     2. History of intracerebral hemorrhage status post evacuation of hematoma.   3. Status post endoscopic fenestration of the left temporal horn cyst related to entrapment.   4. Hypertension.   5. Anxiety.   6. Right spastic hemiparesis.  Improved with Botox.     PLAN:   1. Continue Lamictal 300-400 mg and Zonegran 300mg qhs.  2. Labs: Lamictal and Zonegran.  3. Return to clinic in 12 months.  If patient continue to have seizures, options in the future are a. Increase Lamictal or Zonegran, b. Add Vimpat.      20 min total time was spent on the day of this visit.      10 min was spent on face to face time  10 min was spent on preparation of visit to review charts and labs, ordering medications and tests, and documentation of clinical information      Again, thank you for allowing me to participate in the care of your patient.      Sincerely,    Rishi Hirsch MD

## 2023-06-08 NOTE — NURSING NOTE
Patient score 14 on PHQ-9      Is the patient currently in the state of [place]? YES    Visit mode:VIDEO    If the visit is dropped, the patient can be reconnected by: VIDEO VISIT: Text to cell phone: [phone]    Will anyone else be joining the visit? YES: Hailee's Friend  How would they like to receive their invitation? Text to cell phone: [phone]      How would you like to obtain your AVS? MyChart    Are changes needed to the allergy or medication list? NO    Reason for visit: PONCE [place], VF

## 2023-06-08 NOTE — PROGRESS NOTES
Virtual Visit Details    Type of service:  Video Visit   Video Start Time: 10:06 AM  Video End Time:10:16 AM    Originating Location (pt. Location): Home    Distant Location (provider location):  On-site  Platform used for Video Visit: Jael

## 2023-06-27 ENCOUNTER — LAB (OUTPATIENT)
Dept: LAB | Facility: CLINIC | Age: 63
End: 2023-06-27
Payer: MEDICARE

## 2023-06-27 DIAGNOSIS — G40.909 SEIZURE DISORDER (H): ICD-10-CM

## 2023-06-27 PROCEDURE — 36415 COLL VENOUS BLD VENIPUNCTURE: CPT

## 2023-06-27 PROCEDURE — 80175 DRUG SCREEN QUAN LAMOTRIGINE: CPT | Mod: 90

## 2023-06-27 PROCEDURE — 80203 DRUG SCREEN QUANT ZONISAMIDE: CPT | Mod: 90

## 2023-06-28 LAB — LAMOTRIGINE SERPL-MCNC: 10.5 UG/ML

## 2023-06-29 LAB — ZONISAMIDE SERPL-MCNC: 15 UG/ML

## 2023-08-14 ENCOUNTER — TELEPHONE (OUTPATIENT)
Dept: NEUROLOGY | Facility: CLINIC | Age: 63
End: 2023-08-14

## 2023-08-14 NOTE — TELEPHONE ENCOUNTER
What is the concern that needs to be addressed by a nurse? Kp koo said patient had mild seizure, not sure what caused this, If any questions please reach out to hanane     May a detailed message be left on voicemail? yes    Date of last office visit:     Message routed to: mincep

## 2023-08-17 NOTE — TELEPHONE ENCOUNTER
Call returned by Maribeth.  Yong was sitting out on the deck with some neighbors. He told one of them that he was about to have a seizure, and was then observed to have a 20 second episode described as a seizure.  There is no description available of the actual seizure other than Yong lost awareness  Patient is in a wheelchair    No HA,   No missed medications  Patient has cut back on smoking  Prior to the seizure patient reports being sleepier than usual;  No changes in medical status.    Patient has not had a seizure for >3 years  Maribeth will check with patient to make sure he has not had other seizures he has not reported    Will update  for recommendations

## 2023-08-17 NOTE — TELEPHONE ENCOUNTER
"Chart reviewed  Last visit with  6/8/2023  \"IMPRESSION:   1. Seizures.   Since the last visit, he had no seizures. His last seizure was at the beginning of 2020, probably triggered by the anxiety medication his PMD gave him. He is now on Lamictal 300-400 and Zonegran 300 qhs.  Hijorge luis was having seizures once every 5-6 weeks before started on Zonegran.  It seemed that his seizures were controlled much better since started on Zonegran.  Patient is happy with the seizure control. He is compliant with the seizure meds.     His seizures were described as tonic seizures, with probably LOC, which lasted for 20 sec each.     2. History of intracerebral hemorrhage status post evacuation of hematoma.   3. Status post endoscopic fenestration of the left temporal horn cyst related to entrapment.   4. Hypertension.   5. Anxiety.   6. Right spastic hemiparesis.  Improved with Botox.     PLAN:   1. Continue Lamictal 300-400 mg and Zonegran 300mg qhs.  2. Labs: Lamictal and Zonegran.  3. Return to clinic in 12 months.  If patient continue to have seizures, options in the future are a. Increase Lamictal or Zonegran, b. Add Vimpat.'      Call placed to Maribeth for additional information about the seizure  Voice message left with call back number  "

## 2024-03-15 ENCOUNTER — HOSPITAL ENCOUNTER (EMERGENCY)
Facility: CLINIC | Age: 64
Discharge: HOME | End: 2024-03-15
Attending: EMERGENCY MEDICINE | Admitting: EMERGENCY MEDICINE
Payer: MEDICARE

## 2024-03-15 ENCOUNTER — APPOINTMENT (OUTPATIENT)
Dept: CT IMAGING | Facility: CLINIC | Age: 64
End: 2024-03-15
Attending: EMERGENCY MEDICINE
Payer: MEDICARE

## 2024-03-15 ENCOUNTER — APPOINTMENT (OUTPATIENT)
Dept: MRI IMAGING | Facility: CLINIC | Age: 64
End: 2024-03-15
Attending: NURSE PRACTITIONER
Payer: MEDICARE

## 2024-03-15 VITALS
SYSTOLIC BLOOD PRESSURE: 111 MMHG | OXYGEN SATURATION: 95 % | TEMPERATURE: 97.9 F | DIASTOLIC BLOOD PRESSURE: 72 MMHG | HEART RATE: 87 BPM | RESPIRATION RATE: 12 BRPM

## 2024-03-15 DIAGNOSIS — I69.359 HISTORY OF HEMORRHAGIC STROKE WITH RESIDUAL HEMIPARESIS (H): ICD-10-CM

## 2024-03-15 DIAGNOSIS — G40.909 SEIZURE DISORDER (H): ICD-10-CM

## 2024-03-15 LAB
ANION GAP SERPL CALCULATED.3IONS-SCNC: 11 MMOL/L (ref 7–15)
APTT PPP: 33 SECONDS (ref 22–38)
BASOPHILS # BLD AUTO: 0 10E3/UL (ref 0–0.2)
BASOPHILS NFR BLD AUTO: 0 %
BUN SERPL-MCNC: 13.9 MG/DL (ref 8–23)
CALCIUM SERPL-MCNC: 8.4 MG/DL (ref 8.8–10.2)
CHLORIDE SERPL-SCNC: 102 MMOL/L (ref 98–107)
CREAT SERPL-MCNC: 0.98 MG/DL (ref 0.67–1.17)
DEPRECATED HCO3 PLAS-SCNC: 24 MMOL/L (ref 22–29)
EGFRCR SERPLBLD CKD-EPI 2021: 86 ML/MIN/1.73M2
EOSINOPHIL # BLD AUTO: 0.1 10E3/UL (ref 0–0.7)
EOSINOPHIL NFR BLD AUTO: 1 %
ERYTHROCYTE [DISTWIDTH] IN BLOOD BY AUTOMATED COUNT: 14.1 % (ref 10–15)
GLUCOSE BLDC GLUCOMTR-MCNC: 115 MG/DL (ref 70–99)
GLUCOSE SERPL-MCNC: 129 MG/DL (ref 70–99)
HCT VFR BLD AUTO: 46.2 % (ref 40–53)
HGB BLD-MCNC: 15.4 G/DL (ref 13.3–17.7)
IMM GRANULOCYTES # BLD: 0.1 10E3/UL
IMM GRANULOCYTES NFR BLD: 1 %
INR PPP: 1.22 (ref 0.85–1.15)
LYMPHOCYTES # BLD AUTO: 1.3 10E3/UL (ref 0.8–5.3)
LYMPHOCYTES NFR BLD AUTO: 11 %
MCH RBC QN AUTO: 30 PG (ref 26.5–33)
MCHC RBC AUTO-ENTMCNC: 33.3 G/DL (ref 31.5–36.5)
MCV RBC AUTO: 90 FL (ref 78–100)
MONOCYTES # BLD AUTO: 1.2 10E3/UL (ref 0–1.3)
MONOCYTES NFR BLD AUTO: 10 %
NEUTROPHILS # BLD AUTO: 8.8 10E3/UL (ref 1.6–8.3)
NEUTROPHILS NFR BLD AUTO: 76 %
NRBC # BLD AUTO: 0 10E3/UL
NRBC BLD AUTO-RTO: 0 /100
PLATELET # BLD AUTO: 160 10E3/UL (ref 150–450)
POTASSIUM SERPL-SCNC: 3.7 MMOL/L (ref 3.4–5.3)
RBC # BLD AUTO: 5.13 10E6/UL (ref 4.4–5.9)
SODIUM SERPL-SCNC: 137 MMOL/L (ref 135–145)
TROPONIN T SERPL HS-MCNC: 8 NG/L
WBC # BLD AUTO: 11.5 10E3/UL (ref 4–11)

## 2024-03-15 PROCEDURE — 80175 DRUG SCREEN QUAN LAMOTRIGINE: CPT | Performed by: EMERGENCY MEDICINE

## 2024-03-15 PROCEDURE — 250N000009 HC RX 250: Performed by: EMERGENCY MEDICINE

## 2024-03-15 PROCEDURE — 70450 CT HEAD/BRAIN W/O DYE: CPT | Mod: MA

## 2024-03-15 PROCEDURE — 99291 CRITICAL CARE FIRST HOUR: CPT | Mod: 25 | Performed by: EMERGENCY MEDICINE

## 2024-03-15 PROCEDURE — 70553 MRI BRAIN STEM W/O & W/DYE: CPT | Mod: MG

## 2024-03-15 PROCEDURE — 84484 ASSAY OF TROPONIN QUANT: CPT | Performed by: EMERGENCY MEDICINE

## 2024-03-15 PROCEDURE — 85610 PROTHROMBIN TIME: CPT | Performed by: EMERGENCY MEDICINE

## 2024-03-15 PROCEDURE — 85730 THROMBOPLASTIN TIME PARTIAL: CPT | Performed by: EMERGENCY MEDICINE

## 2024-03-15 PROCEDURE — 82962 GLUCOSE BLOOD TEST: CPT

## 2024-03-15 PROCEDURE — 36415 COLL VENOUS BLD VENIPUNCTURE: CPT | Performed by: EMERGENCY MEDICINE

## 2024-03-15 PROCEDURE — 85025 COMPLETE CBC W/AUTO DIFF WBC: CPT | Performed by: EMERGENCY MEDICINE

## 2024-03-15 PROCEDURE — 93010 ELECTROCARDIOGRAM REPORT: CPT | Performed by: EMERGENCY MEDICINE

## 2024-03-15 PROCEDURE — 250N000013 HC RX MED GY IP 250 OP 250 PS 637: Performed by: EMERGENCY MEDICINE

## 2024-03-15 PROCEDURE — 255N000002 HC RX 255 OP 636: Performed by: EMERGENCY MEDICINE

## 2024-03-15 PROCEDURE — 93005 ELECTROCARDIOGRAM TRACING: CPT | Performed by: EMERGENCY MEDICINE

## 2024-03-15 PROCEDURE — 80048 BASIC METABOLIC PNL TOTAL CA: CPT | Performed by: EMERGENCY MEDICINE

## 2024-03-15 PROCEDURE — 80203 DRUG SCREEN QUANT ZONISAMIDE: CPT | Performed by: EMERGENCY MEDICINE

## 2024-03-15 PROCEDURE — A9585 GADOBUTROL INJECTION: HCPCS | Performed by: EMERGENCY MEDICINE

## 2024-03-15 PROCEDURE — 99207 PR NO CHARGE LOS: CPT | Performed by: NURSE PRACTITIONER

## 2024-03-15 PROCEDURE — 250N000011 HC RX IP 250 OP 636: Performed by: EMERGENCY MEDICINE

## 2024-03-15 PROCEDURE — 70496 CT ANGIOGRAPHY HEAD: CPT | Mod: MA

## 2024-03-15 RX ORDER — IOPAMIDOL 755 MG/ML
67 INJECTION, SOLUTION INTRAVASCULAR ONCE
Status: COMPLETED | OUTPATIENT
Start: 2024-03-15 | End: 2024-03-15

## 2024-03-15 RX ORDER — LACOSAMIDE 100 MG/1
100 TABLET ORAL 2 TIMES DAILY
Qty: 60 TABLET | Refills: 0 | Status: SHIPPED | OUTPATIENT
Start: 2024-03-15 | End: 2024-04-02

## 2024-03-15 RX ORDER — GADOBUTROL 604.72 MG/ML
9 INJECTION INTRAVENOUS ONCE
Status: COMPLETED | OUTPATIENT
Start: 2024-03-15 | End: 2024-03-15

## 2024-03-15 RX ADMIN — GADOBUTROL 9 ML: 604.72 INJECTION INTRAVENOUS at 16:47

## 2024-03-15 RX ADMIN — LAMOTRIGINE 300 MG: 100 TABLET ORAL at 19:31

## 2024-03-15 RX ADMIN — IOPAMIDOL 67 ML: 755 INJECTION, SOLUTION INTRAVENOUS at 14:42

## 2024-03-15 RX ADMIN — SODIUM CHLORIDE 100 ML: 9 INJECTION, SOLUTION INTRAVENOUS at 14:43

## 2024-03-15 ASSESSMENT — ACTIVITIES OF DAILY LIVING (ADL)
ADLS_ACUITY_SCORE: 41

## 2024-03-15 ASSESSMENT — COLUMBIA-SUICIDE SEVERITY RATING SCALE - C-SSRS
1. IN THE PAST MONTH, HAVE YOU WISHED YOU WERE DEAD OR WISHED YOU COULD GO TO SLEEP AND NOT WAKE UP?: NO
2. HAVE YOU ACTUALLY HAD ANY THOUGHTS OF KILLING YOURSELF IN THE PAST MONTH?: NO
6. HAVE YOU EVER DONE ANYTHING, STARTED TO DO ANYTHING, OR PREPARED TO DO ANYTHING TO END YOUR LIFE?: NO

## 2024-03-15 NOTE — ED PROVIDER NOTES
"  History     Chief Complaint   Patient presents with    Cerebrovascular Accident     Called in field per EMS.  Right sided weakness, right facial droop.  LKW just before 1353     HPI  Garcia Canada is a 63 year old male with history significant for hemorrhagic stroke (2014) w/ right sided deficits, hydrocephalus, seizure disorder, s/p craniotomy, who was brought in from home by ambulance with concern for stroke. Last known well 1350 this afternoon. Home health nurse called 911 and reported right sided weakness, right sided facial droop, and speech difficulty. Blood glucose in filed of 147. Patient c/o headache earlier.     Stroke evaluation requested from EMS, patient evaluated briefly upon arrival. Following commands in all extremities, right facial droop present. Patient denies any prior stroke even though this is well documented. Given prior hemorrhage, c/o headache, unclear history, Tier 1 code stroke activated.     Additional history obtained after CT imaging. Patient now reports prior hemorrhage and can detail prior hospitalizations and surgery.  Has weakness of his right side which is at baseline.  Has a splint on his right wrist and a brace on his left leg.  Does not walk unassisted.  He gets groceries delivered once a month and has close friend help with his medications.  He cannot recall the specific names.  He said he did not feel quite right after eating earlier today and felt as if he may have a seizure coming.  Only reports 2-3 seizures in the past few years.  He says when he has a seizures things on the right side of his body \"change\" and \"tighten up\".  Says they never last very long.  Afterwards he often has a headache and he takes aspirin for this.    Independent history obtain from close friend/POA over phone. Maribeth Batista [phone]. Last seizure about one month ago. Confirm that he take Aonisamide and Lamotrigine. Had  virtual visit June of last year. " "      ==================================================================    CHART REVIEW:  Neurology [facility]2023 [facility]    \"CHIEF COMPLAINT: Seizures.     HISTORY OF PRESENT ILLNESS:  Video call for follow up. His friend Maribeth was also with him with the video. This patient is a 63-year-old right-handed male returns for follow up for seizures. He had a history of right-sided meningioma, status post resection in 01/2014. He had an intracerebral hemorrhage over the left side of the brain. Since the last visit, he had no seizures. His last seizure was at the beginning of 2020, probably triggered by the anxiety medication his PMD gave him. He is now on Lamictal 300-400 and Zonegran 300 qhs.  Hie was having seizures once every 5-6 weeks before started on Zonegran.  It seemed that his seizures were controlled much better since started on Zonegran.  Patient is happy with the seizure control. He is compliant with the seizure meds.     His seizures were described as tonic seizures, with probably LOC, which lasted for 20 sec each.     He was stried on Depakote recently due to increased seizure frequency.  Unfortunately, he experienced significant side effects from Depakote.  He was having significant hallucinations for the past two weeks, and Depakote had to be discontinued.     He had Botox injection for his right sided spasticity.  His spasticity improved.     According to the patient, he had a history of right-sided meningioma, status post resection in 01/2014. He had an intracerebral hemorrhage over the left side of the brain. He underwent a craniotomy for intraparenchymal hematoma on 01/02/2014. He had one seizure at the time of the hemorrhage. The description of the seizure is unclear.   In 11/2014, he had a repeat CT scan which showed dilatation of the left temporal horn related to entrapment. On 11/12/2014, Dr. Sudarshan Plummer performed an endoscopic fenestration of the cyst, and he was found to have " seizures in the hospital. The patient reported that he had a total of four seizures since 11/2014. Three were generalized tonic-clonic seizures. One was probably simple partial seizure. The simple partial seizure was right arm tonic posturing with no loss of consciousness. He remembered those and the generalized tonic-clonic seizures started with right hand tonic posturing and then progressed to generalized tonic-clonic movement. He reported that two of the seizures were witnessed. One was witnessed in the hospital and one was witnessed by his sister.      TRIGGERS FOR SEIZURES: Unclear except missing medications.   RISK FACTORS FOR SEIZURES: Left intraparenchymal hemorrhage of the brain in 01/2014. He had no history of head trauma with loss of consciousness. No history of CNS infection. No history of febrile convulsions.   PAST MEDICAL HISTORY: Cerebral intraparenchymal hemorrhage with hematoma, aneurysm basilar tip, right spastic hemiparesis, hypertension, seizures.   PAST SURGICAL HISTORY: Craniotomy with evacuation of hematoma subdural endoscopy, optical tracking system, ventriculostomy.         Current Outpatient Prescriptions          Current Outpatient Medications   Medication Sig Dispense Refill    acetaminophen (TYLENOL) 325 MG tablet Take 2 tablets (650 mg) by mouth every 6 hours as needed for mild pain        atorvastatin (LIPITOR) 10 MG tablet Take 1 tablet (10 mg) by mouth daily        IBUPROFEN PO Take 1-2 tablets by mouth every 6 hours as needed for moderate pain        lamoTRIgine (LAMICTAL) 100 MG tablet Take 3 tabs (300 mg) in the morning and 4 tabs in the evening. 630 tablet 3    sertraline (ZOLOFT) 100 MG tablet Take 200 mg by mouth         zonisamide (ZONEGRAN) 100 MG capsule Take 3 tabs (300 mg) at hs 270 capsule 3    carvedilol (COREG) 6.25 MG tablet Take 1 tablet (6.25 mg) by mouth 2 times daily (Patient not taking: Reported on 7/7/2022) 60 tablet 0            ALLERGIES: No known drug  allergy.   FAMILY HISTORY: No family history of seizures.   SOCIAL HISTORY: He is single, living by himself. Smokes one pack every five days cigarettes. No alcohol use. No drug abuse.   REVIEW OF SYSTEMS: Right-sided weakness and spasticity. Anxiety and seizures. The rest of the 10-point review of systems is negative.      PHYSICAL EXAMINATION:      AAO x 3, speech fluent and appropriate. Speech fluent and appropriate. Hearing grossly normal.     PREVIOUS DIAGNOSTIC TESTING: CT of the head on 04/17/2015 showed slight interval decrease in dilated left temporal cyst and the dilated left temporal horn since the prior exam, previous left-sided craniotomy and the white matter changes in the left hemisphere again noted. MRI scan on 10/07/2014 showed trapped left lateral ventricle with cystic dilatation and adjacent vasogenic edema causing mass effect, unchanged. MRI scan on 01/02/2014 showed increased size of a hyperacute to acute intraparenchymal hemorrhage in the left posterior frontoparietal region with hemorrhage extending into an adjacent area of preexisting encephalomalacia as well as the left lateral ventricle. There is new blood extending into the right lateral ventricle occipital horn and possibly the fourth ventricle. Acute subarachnoid hemorrhage is again seen over the left parietal and the temporal lobe and insula with possible new subarachnoid blood over the cerebellar folia. There are two regions of enhancement along the anteromedial and anterolateral hemorrhage cavity which may represent sites of residual or recurrent tumor. Comparison with prior image would likely be helpful.     EEG on of 12/01/2014 showed abnormal. There is evidence of focal cerebral dysfunction over the left hemisphere, especially the left posterior hemisphere. This is consistent with focal cerebral dysfunction in this region.     EEG on 01/03/2014 showed abnormal EEG due to the presence of mild diffuse slowing consistent with mild  "diffuse encephalopathy as well as additional severe slowing over the left temporal parietal head region consistent with the patient's history of subdural hematoma in this region.      Component      Latest Ref Rng & Units 10/17/2016 4/3/2017   Lamotrigine Level       10.1 12.4   Zonisamide Level Quant         17.3         IMPRESSION:   1. Seizures.   Since the last visit, he had no seizures. His last seizure was at the beginning of 2020, probably triggered by the anxiety medication his PMD gave him. He is now on Lamictal 300-400 and Zonegran 300 qhs.  Hijorge luis was having seizures once every 5-6 weeks before started on Zonegran.  It seemed that his seizures were controlled much better since started on Zonegran.  Patient is happy with the seizure control. He is compliant with the seizure meds.     His seizures were described as tonic seizures, with probably LOC, which lasted for 20 sec each.     2. History of intracerebral hemorrhage status post evacuation of hematoma.   3. Status post endoscopic fenestration of the left temporal horn cyst related to entrapment.   4. Hypertension.   5. Anxiety.   6. Right spastic hemiparesis.  Improved with Botox.     PLAN:   1. Continue Lamictal 300-400 mg and Zonegran 300mg qhs.  2. Labs: Lamictal and Zonegran.  3. Return to clinic in 12 months.  If patient continue to have seizures, options in the future are a. Increase Lamictal or Zonegran, b. Add Vimpat.\"        END CHART REVIEW  ==================================================================    The patient's PMHx, Surgical Hx, Allergies, and Medications were all reviewed with the patient.    Allergies:  Allergies   Allergen Reactions    Aspirin Other (See Comments)     Hz of hemorraghic stroke       Problem List:    Patient Active Problem List    Diagnosis Date Noted    Seizure disorder (H) 02/10/2017     Priority: Medium    History of hemorrhagic stroke with residual hemiparesis (H) 12/22/2014     Priority: Medium    Nontraumatic " brain injury 11/21/2014     Priority: Medium     IMO Regulatory Load OCT 2020      Hydrocephalus (H) 11/12/2014     Priority: Medium    Hydrocephalus, acquired (H) 10/20/2014     Priority: Medium    Cerebral aneurysm without rupture 10/20/2014     Priority: Medium    Cerebral infarction (H) 08/29/2014     Priority: Medium    Right hemiparesis (H) 08/29/2014     Priority: Medium     Formatting of this note might be different from the original.   Secondary to CVA      Intraparenchymal hemorrhage of brain (H) 01/01/2014     Priority: Medium        Past Medical History:    Past Medical History:   Diagnosis Date    Aneurysm (H)     Hypertension     Right spastic hemiparesis (H)     Seizures (H)     Traumatic cerebral intraparenchymal hematoma (H)     Unspecified cerebral artery occlusion with cerebral infarction        Past Surgical History:    Past Surgical History:   Procedure Laterality Date    CRANIOTOMY, EVACUATE HEMATOMA SUBDURAL, COMBINED  1/2/2014    Procedure: COMBINED CRANIOTOMY, EVACUATE HEMATOMA SUBDURAL;  Left Sided Craniotomy for Hematoma Evacuation;  Surgeon: Sudarshan Plummer MD;  Location:  OR     [facility] ENDOSCOPY W PLACEMENT GASTROSTOMY TUBE PERCUT  1/15/2014    Procedure: COMBINED ESOPHAGOSCOPY, GASTROSCOPY, DUODENOSCOPY (EGD), PLACE PERCUTANEOUS ENDOSCOPIC GASTROSTOMY TUBE;  Surgeon: Sree Barboza MD;  Location: [facility]    INTUBATE ENDOTRACHEAL  1/2/2014    Procedure: INTUBATE ENDOTRACHEAL;  Pt to go to IR 1 for intubation first, Left Craniotomy for Hematoma Evac, [place], Cranioplasty Removal Set,    ;  Surgeon: Provider, Generic Anesthesia;  Location:  OR    OPTICAL TRACKING SYSTEM VENTRICULOSTOMY Left 11/12/2014    Procedure: OPTICAL TRACKING SYSTEM VENTRICULOSTOMY;  Surgeon: Sudarshan Plummer MD;  Location: [facility]       Family History:    Family History   Problem Relation Age of Onset    Unknown/Adopted Mother     Unknown/Adopted Father        Social History:  Marital Status:  Single  [1]  Social History     Tobacco Use    Smoking status: Every Day     Packs/day: 1.5     Types: Cigarettes    Smokeless tobacco: Never   Substance Use Topics    Alcohol use: No    Drug use: No        Medications:    atorvastatin (LIPITOR) 10 MG tablet  Lacosamide (VIMPAT) 100 MG TABS tablet  sertraline (ZOLOFT) 100 MG tablet  acetaminophen (TYLENOL) 325 MG tablet  carvedilol (COREG) 6.25 MG tablet  IBUPROFEN PO  lamoTRIgine (LAMICTAL) 100 MG tablet  zonisamide (ZONEGRAN) 100 MG capsule          Review of Systems  Pertinent positives and negatives  in HPI    Physical Exam   BP: 126/73  Pulse: 84  Temp: 97.9  F (36.6  C)  Resp: 16  SpO2: 93 %    Physical Exam  General: Awake, alert, and cooperative. No acute distress  Mental Status: Oriented x 3. Speech normal.  Head: Normocephalic, atraumatic, symmetric.   Eyes: Conjunctiva normal, no discharge, PERRL 2 mm. Right visual field cut in both eyes (baseline)  Ears, Nose, Throat: External ears and nares normal.  No oral exudates. Oral mucosa dry. Edentulous.   Neck: Supple. No adenopathy. Non-tender.   Cardiovascular: Regular rate. Regular rhythm. No murmurs. Peripheral pulses strong. Normal capillary refill. No LE edema.   Respiratory: Normal effort. No wheezes, rales, or rhonchi bilaterally.  Chest Wall: Equal rise.  Abdomen: No tenderess, soft, non-distended. No rebound or guarding. No masses palpated  Musculoskeletal: No gross deformity. Warm and well perfused  Neurologic: Mental status: Alert, awake. Oriented to self, date, and place. Normal speech and language. GCS 15  Cranial Nerves: II-XII intact see eyes above  Motor: Follows commands x 4 extremities.  Upper Extremities:   RUE: 4/5 shoulder abduction. 4/5 elbow flex/ext. 4/5 wrist flex/ext. 4/5 hand .   LUE: 5/5 shoulder abduction. 5/5 elbow flex/ext. 5/5 wrist flex/ext. 5/5 hand .   Lower Extremities:   RLE: 4/5 hip flexion. 4/5 knee flex/ext. 4/5 ankle plantar-/dorsiflexion. 44/5 EHL.   LLE: 5/5 hip  flexion. 5/5 knee flex/ext. 5/5 ankle plantar-/dorsiflexion. 5/5 EHL   Sensory: Sensation intact to light touch in all 4 extremities   Coordination: Finger-nose finger intact.   Skin: Warm, dry, no pallor, no erythema, no jaundice or rash.  Psychiatric:  Appropriate affect. Behavior is normal.    ED Course        Procedures         EKG: Interpreted by Gil Rob MD sinus rhythm with rate 84 bpm.  Poor R wave progression.  Normal axis.  Delayed R wave progression.  No ST segment elevations or depressions.  No abnormal T wave inversions.  Left atrial enlargement.  EKG from 1/1/2014 sinus rhythm, left atrial enlargement but did have normal progression of R wave.  Impression sinus rhythm with no evidence of acute ischemia.       Critical Care time:  was 30 minutes for this patient excluding procedures.         The patient has stroke symptoms:         ED Stroke specific documentation           NIHSS PDF     Patient last known well time: 0150  ED Provider first to bedside at: 2:36 PM   CT Results received at: N/A    Thrombolytics:   Not given due to:   - history of intracranial hemorrhage  - minor/isolated/quickly resolving symptoms    If treating with thrombolytics: Ensure SBP<180 and DBP<105 prior to treatment with thrombolytics.  Administering thrombolytics after treatment with IV labetalol, hydralazine, or nicardipine is reasonable once BP control is established.    Endovascular Retrieval:  Not initiated due to absence of proximal vessel occlusion    National [employer] Stroke Scale (Baseline)  Time Performed: 2:36 PM     Score    Level of consciousness: (0)   Alert, keenly responsive    LOC questions: (0)   Answers both questions correctly    LOC commands: (0)   Performs both tasks correctly    Best gaze: (0)   Normal    Visual: (1)   Partial hemianopia    Facial palsy: (1)   Minor paralysis (flat nasolabial fold, smile asymmetry)    Motor arm (left): (0)   No drift    Motor arm (right): (1)   Drift     Motor leg (left): (0)   No drift    Motor leg (right): (1)   Drift    Limb ataxia: (0)   Absent    Sensory: (0)   Normal- no sensory loss    Best language: (0)   Normal- no aphasia    Dysarthria: (0)   Normal    Extinction and inattention: (0)   No abnormality        Total Score:  4        Stroke Mimics were considered (including migraine headache, seizure disorder, hypoglycemia (or hyperglycemia), head or spinal trauma, CNS infection, Toxin ingestion and shock state (e.g. sepsis) .                   Results for orders placed or performed during the hospital encounter of 03/15/24 (from the past 24 hour(s))   Glucose by meter   Result Value Ref Range    GLUCOSE BY METER POCT 115 (H) 70 - 99 mg/dL   CT Head w/o Contrast    Narrative    EXAM: CT HEAD W/O CONTRAST  3/15/2024 2:38 PM     HISTORY:  Code Stroke to evaluate for potential thrombolysis and  thrombectomy. PLEASE READ IMMEDIATELY.       COMPARISON:  Head CT from 1/4/2017    TECHNIQUE: Using multidetector thin collimation helical acquisition  technique, axial, coronal and sagittal CT images from the skull base  to the vertex were obtained without intravenous contrast.   (topogram) image(s) also obtained and reviewed. Dose reduction  techniques were performed.    FINDINGS:  Postsurgical changes of left temporoparietal craniectomy with  underlying encephalomalacia and gliosis in the left cerebral  hemisphere. Unchanged dilatation of the temporal and occipital horn of  left lateral ventricle. Increased size of enhancing nodular lesion  about the medial aspect of dilated left lateral ventricle measuring  2.0 x 2.2 cm, previously 1.0 x 0.7 cm. This may represent an  intraventricular meningioma. Right frontal encephalomalacia and  gliosis. Unchanged leftward midline shift at the level of the septum  pellucidum.    No acute intracranial hemorrhage. No acute loss of gray-white matter  differentiation in the cerebral hemispheres. Clear basal cisterns.    The  visualized portions of the paranasal sinuses and mastoid air cells  are clear. Grossly normal orbits.       Impression    IMPRESSION:   1. No acute intracranial pathology.   2. Stable postsurgical changes of left temporoparietal craniotomy with  underlying encephalomalacia and gliosis. Dilated left lateral  ventricle temporal and parietal horns. Increased size of enhancing  hyperdense lesion in the medial aspect of the dilated left lateral  ventricle measuring 2.0 x 2.2 cm. This is likely intraventricular  neoplasm such as meningioma.    ANNABEL VALERA MD         SYSTEM ID:  [id number]   CTA Head Neck with Contrast    Narrative    EXAM: CTA HEAD NECK W CONTRAST  3/15/2024 2:43 PM     HISTORY:  Code Stroke to evaluate for potential thrombolysis and  thrombectomy. PLEASE READ IMMEDIATELY.       COMPARISON:  No prior CTA     TECHNIQUE:    HEAD and NECK CTA: During rapid bolus intravenous injection of  nonionic contrast material, axial images were obtained using thin  collimation multidetector helical technique from the base of the upper  aortic arch through the [place]. This CT angiogram data was  reconstructed at thin intervals with mild overlap. Images were sent to  the 3D workstation, and 3D reconstructions were obtained. The axial  source images, multiplanar reformations, 3D reconstructions in both  maximum intensity projection display and volume rendered models were  reviewed, with reconstructions performed by the technologist and the  radiologist.    CONTRAST: 67 mL [id number]    FINDINGS:    Head CTA demonstrates no intracranial arterial stenosis or occlusion.  There is a 4.3 x 4.6 x 3.6 mm saccular aneurysm at the basilar tip.    Neck CTA demonstrates no internal carotid artery stenosis. No  vertebral artery stenosis. Patent and conventional aortic arch  branching pattern. Atherosclerotic calcifications at bilateral carotid  bifurcations.    No acute finding in the visualized neck soft tissues, or in  the  superior mediastinum/thorax. Emphysematous changes in bilateral lung  apices      Impression    IMPRESSION:    1. Head CTA demonstrates 4.3 x 4.6 x 3.6 mm saccular aneurysm at the  basilar tip.   2. Neck CTA demonstrates no stenosis of the major cervical arteries.    ANNABEL VALERA MD         SYSTEM ID:  [id number]   CBC with Platelets & Differential    Narrative    The following orders were created for panel order CBC with Platelets & Differential.  Procedure                               Abnormality         Status                     ---------                               -----------         ------                     CBC with platelets and d...[[id number]]  Abnormal            Final result                 Please view results for these tests on the individual orders.   Basic metabolic panel   Result Value Ref Range    Sodium 137 135 - 145 mmol/L    Potassium 3.7 3.4 - 5.3 mmol/L    Chloride 102 98 - 107 mmol/L    Carbon Dioxide (CO2) 24 22 - 29 mmol/L    Anion Gap 11 7 - 15 mmol/L    Urea Nitrogen 13.9 8.0 - 23.0 mg/dL    Creatinine 0.98 0.67 - 1.17 mg/dL    GFR Estimate 86 >60 mL/min/1.73m2    Calcium 8.4 (L) 8.8 - 10.2 mg/dL    Glucose 129 (H) 70 - 99 mg/dL   INR   Result Value Ref Range    INR 1.22 (H) 0.85 - 1.15   Partial thromboplastin time   Result Value Ref Range    aPTT 33 22 - 38 Seconds   Troponin T, High Sensitivity   Result Value Ref Range    Troponin T, High Sensitivity 8 <=22 ng/L   CBC with platelets and differential   Result Value Ref Range    WBC Count 11.5 (H) 4.0 - 11.0 10e3/uL    RBC Count 5.13 4.40 - 5.90 10e6/uL    Hemoglobin 15.4 13.3 - 17.7 g/dL    Hematocrit 46.2 40.0 - 53.0 %    MCV 90 78 - 100 fL    MCH 30.0 26.5 - 33.0 pg    MCHC 33.3 31.5 - 36.5 g/dL    RDW 14.1 10.0 - 15.0 %    Platelet Count 160 150 - 450 10e3/uL    % Neutrophils 76 %    % Lymphocytes 11 %    % Monocytes 10 %    % Eosinophils 1 %    % Basophils 0 %    % Immature Granulocytes 1 %    NRBCs per 100 WBC 0 <1 /100     Absolute Neutrophils 8.8 (H) 1.6 - 8.3 10e3/uL    Absolute Lymphocytes 1.3 0.8 - 5.3 10e3/uL    Absolute Monocytes 1.2 0.0 - 1.3 10e3/uL    Absolute Eosinophils 0.1 0.0 - 0.7 10e3/uL    Absolute Basophils 0.0 0.0 - 0.2 10e3/uL    Absolute Immature Granulocytes 0.1 <=0.4 10e3/uL    Absolute NRBCs 0.0 10e3/uL   MR Brain w/o & w Contrast    Narrative    EXAM: MR BRAIN W/O and W CONTRAST  LOCATION: [facility]  DATE: 3/15/2024    INDICATION: R facial droop + elarging cyst on CT  COMPARISON: CT 03/15/2024. 01/04/2017  CONTRAST: gadavist 9 ml  TECHNIQUE: Routine multiplanar multisequence head MRI without and with intravenous contrast.    FINDINGS:  INTRACRANIAL CONTENTS: Postsurgical changes of prior left parietal craniotomy with unchanged gradient susceptibility and encephalomalacia in the posterior left cerebral hemisphere. Additional gliosis present in the right frontal lobe along an old   ventriculostomy tract.     Redemonstrated is an intraventricular mass located posteriorly in the left lateral ventricle just anterior to the splenium of the corpus callosum. The mass demonstrates homogeneous enhancement, measures 1.9 x 2.2 x 1.9 cm, and has increased in size from   2017, likely representing an intraventricular meningioma. 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      No acute or subacute infarct. No acute intracranial hemorrhage, extra-axial collection, or midline shift. Outside of the areas of encephalomalacia there is mild scattered white matter T2/FLAIR signal abnormality which presumably axial chronic small   vessel ischemic change. Mild generalized cerebral atrophy.  Normal position of the cerebellar tonsils.    SELLA: No abnormality accounting for technique.    OSSEOUS STRUCTURES/SOFT TISSUES: Normal marrow signal. The major intracranial vascular flow voids are maintained.      ORBITS: No abnormality accounting for technique.     SINUSES/MASTOIDS: No paranasal sinus mucosal disease. No middle ear or mastoid effusion.       Impression    IMPRESSION:  1.  Enhancing intraventricular mass in the left lateral ventricle is increased in size from 2017 and likely represents an intraventricular meningioma.  2.  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  3.  No superimposed acute intracranial process.  4.  Stable chronic changes including previous left sided craniotomy with stable postsurgical change and encephalomalacia of the left cerebral hemisphere detailed above.       Medications   iopamidol (ISOVUE-370) solution 67 mL (67 mLs Intravenous $Given 3/15/24 1442)   sodium chloride 0.9 % bag 500mL for CT scan flush use (100 mLs Intravenous $Given 3/15/24 1443)   gadobutrol (GADAVIST) injection 9 mL (9 mLs Intravenous $Given 3/15/24 1647)   lamoTRIgine (LaMICtal) tablet 300 mg (300 mg Oral $Given 3/15/24 1931)       Assessments & Plan (with Medical Decision Making)   63 year old male with past medical history notable for meningioma, hemorrhagic stroke with right-sided deficits, status post craniotomy seizure disorder, who was brought in by home by EMS with concern for acute CVA.  Report of right-sided weakness and right-sided facial droop from home therapist.  Last known well less than 2 hours and tier 1 stroke activation.  Patient briefly assessed and did show right-sided facial droop and did not give coherent history on brief examination prior to CT imaging.  CT head with no acute intracranial pathology.  There was stable postsurgical changes of left temporoparietal craniotomy with underlying encephalomalacia and gliosis.  There is a hyperdense lesion in the medial aspect of dilated left lateral ventricle which measures 2 x 2.2 cm likely neoplasm such as meningioma.  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  No stenosis of major cervical arteries. Stroke Code deescalated.     MR julien with enhancing intraventricular mass in the laft lateral ventricle is increased in size from 2017 and likely represents an intraventricular meningioma. 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  No superimposed acute intracranial process.  Stable chronic changes including previous left-sided craniotomy with stable postoperative changes and encephalomalacia in the left cerebral hemisphere.  Finding discussed with stroke neurology and felt very unlikely to represent acute stroke or TIA.    My suspicion is that he had a seizure.  His presentation earlier with EMS and the way he slowly progressed would be consistent with a postictal state.  Patient tells me that he felt that he may be having a seizure, on earlier in the day and often gets a headache with it.  Initially did complain of headache.  His close friend and POA spoke with him on the phone and also thought that talking to him he seemed similar to when he had seizure previously.  She reports that his last seizure was about a month ago and then had one a few months prior to that.  This is definitely increased frequency.  Note reviewed from Dr. Seymour from last June and recommended either increasing dosing of current medications or starting Vimpat.  I spoke with Dr. Ruba Rodney with general neurology and he recommended starting Vimpat 100 mg twice daily.  Prescription sent to patient's preferred pharmacy.  The lamotrigine and zonisamide levels are pending.  I did discuss the increase in size of probable meningioma compared to 2017 with patient, with patient's friend/POA Maribeth, neurology and Dr. Edmondson thought that this is would be addressed by Dr. Pastor when they meet.  Do not think anything needs to be done acutely.    I have reviewed the nursing notes.          New Prescriptions    LACOSAMIDE (VIMPAT) 100 MG TABS TABLET    Take 1 tablet (100 mg) by mouth 2 times daily for 30 days       Final diagnoses:   History of hemorrhagic stroke with residual hemiparesis (H)   Seizure disorder (H)     Gil Rob MD        3/15/2024   [facility] EMERGENCY DEPT    Disclaimer: This note consists of words and symbols derived from keyboarding and dictation using voice recognition software.  As a result, there may be errors that have gone undetected.  Please consider this when interpreting information found in this note.               Gil Rob MD  03/15/24 1955

## 2024-03-15 NOTE — CONSULTS
[facility]    Stroke Telephone Note    I was called by Gil Elizondo on 03/15/24 regarding patient Garcia Canada. The patient is a 63 year old male with PMH of R meningioma s/p resection (2014), L IPH s/p craniotomy (2014), seizure disorder. He presents to the ED for evaluation of facial droop. LKW reported as 1350 per EMS when he had onset of R facial droop. EMS initially reported patient had severe headache, but he denies that at present. He denies history of stroke, but when asked about prior bleeding into the brain reports this has happened multiple times. Per ED provider on examination patient has clear R facial droop but no dysarthria, is able to move all extremities without evidence of weakness. Somewhat unclear if R facial droop is change from baseline.  /73.     Vitals  BP: 126/73   Pulse: 87   Resp: 12   Temp: 97.9  F (36.6  C)        Stroke Code Data (for stroke code without tele)  Stroke code activated 03/15/24  1433   Stroke provider first response 03/15/24  1435   Last known normal 03/15/24  1350      Time of discovery (or onset of symptoms) 03/15/24  1350   Head CT read by Stroke Neuro Provider 03/15/24  1438   Was stroke code de-escalated? Yes  03/15/24  1449     Imaging Findings  CT head: no hemorrhage or other acute findings; stable postsurgical changes of left temporoparietal craniotomy with underlying encephalomalacia and gliosis. Dilated left lateral ventricle temporal and parietal horns. Increased size of enhancing hyperdense lesion in the medial aspect of the dilated left lateral ventricle measuring 2.0 x 2.2 cm    CTA head/neck:   1. Head CTA demonstrates 4.3 x 4.6 x 3.6 mm saccular aneurysm at the  basilar tip.   2. Neck CTA demonstrates no stenosis of the major cervical arteries.    Intravenous Thrombolysis  Not given due to:   - history of intracranial hemorrhage  - minor/isolated/quickly resolving symptoms    Endovascular Treatment  Not  "initiated due to absence of proximal vessel occlusion    Impression  R facial droop; unclear if residual from prior hemorrhage vs recrudescence vs new central etiology    Enlarging cystic lesion     Recommendations   -brain MRI with and without contrast; please page stroke neurology if infarct is identified for further recommendations  -consider neurosurgery consult regarding cystic lesion     Case discussed with vascular neurology attending Dr. Portillo.    My recommendations are based on the information provided over the phone by Garcia Canada's in-person providers. They are not intended to replace the clinical judgment of his in-person providers. I was not requested to personally see or examine the patient at this time.     OZIEL Yeager CNP  Vascular Neurology    To page me or covering stroke neurology team member, click here: AMCOM  Choose \"On Call\" tab at top, then select \"NEUROLOGY/ALL SITES\" from middle drop-down box, press Enter, then look for \"stroke\" or \"telestroke\" for your site.   "

## 2024-03-15 NOTE — ED TRIAGE NOTES
"Pt coming in via EMS.  Was picked up from home.  Home care nurse saw patient at 1353,  noticed a right facial droop, right sided weakness and was unable to form complete sentences.  Upon arrival, patient able to move right and arm leg.  States he had a headache, does not have one now.  Per EMS, patient seemed to be in a \"fog\", did not appear to have a clear thought process.  Right arm dropped for EMS when both arms extended.  Pt with brace on right leg and right hand.  Pt brought to CT on EMS cart after speaking with MD.        "

## 2024-03-16 NOTE — ED NOTES
Gisele NARAYANAN spoke with friend Maribeth, Maribeth will try to call pt friend carleen for ride home. Awaiting to hear back from Maribeth

## 2024-03-16 NOTE — PROGRESS NOTES
I was called by Dr. Rob regarding this patient and suspected breakthrough seizure leading to this ED visit.  Patient has had at least 3 seizures over the past 1 to 2 months and his antiepileptic levels are currently pending-these will not return for several days.  Given that he is on relatively high doses of zonisamide and lamotrigine and after reviewing the note by his epileptologist Dr. Hirsch, I think it is reasonable to start a third antiepileptic medication.  Would recommend Vimpat 100 mg twice daily.    The goal is to reduce the risk of seizure leading up to his next appointment with Dr. Hirsch.  If it turns out that there is a clear reason for breakthrough seizure (medication noncompliance, low levels of either lamotrigine or zonisamide, alcohol withdrawal) then it would be reasonable to make necessary adjustments of his 2 primary antiepileptic medications, and to discontinue the Vimpat.    Carlos Thompson MD

## 2024-03-16 NOTE — DISCHARGE INSTRUCTIONS
I suspect that you had a seizure today.    I would like you to follow up with Dr. Hirsch with Neurology.    Lesion in your head that was 1 cm in size is now 2 cm in size. Can follow up on this with Neurology.     Start taking Vimpat 100 mg twice daily.    Continue lamictal and zonegran.    If your symptoms worsen or you develop new or concerning symptoms, please return to the Emergency Department for further evaluation and treatment.

## 2024-03-18 LAB
LAMOTRIGINE SERPL-MCNC: 8.8 UG/ML
ZONISAMIDE SERPL-MCNC: 16 UG/ML

## 2024-03-20 ENCOUNTER — TELEPHONE (OUTPATIENT)
Dept: NEUROLOGY | Facility: CLINIC | Age: 64
End: 2024-03-20
Payer: MEDICARE

## 2024-03-20 NOTE — TELEPHONE ENCOUNTER
What is the concern that needs to be addressed by a nurse?     Maribeth states that Garcia has been having more seizures, the recent MRI found changes, she would like to know if Dr. Hirsch would like a in person office visit instead of a virtual. Scheduled her with Janie for a sooner appt on 04/26/24. Maribeth requested that appt be virtual until she speaks to a nurse to go over MRI. Has appt with Dr. Hirsch on 06/17/24. Maribeth would like a call back and can be reached at [phone].     May a detailed message be left on [email or web address]? yes    Date of last office visit: 06/08/23    Message routed to: RN micep pool

## 2024-03-20 NOTE — TELEPHONE ENCOUNTER
Writer returned call to Maribeth. Sounds like Maribeth did not have any questions for an RN at this time. Said that she would be waiting until appointment with Mayra to discuss MRI results

## 2024-04-02 DIAGNOSIS — R56.9 SEIZURES (H): Primary | ICD-10-CM

## 2024-04-06 NOTE — TELEPHONE ENCOUNTER
Lacosamide (VIMPAT) 100 MG TABS tablet   Last Written Prescription Date:  3/15/2024-4/14/2024  Last Fill Quantity: 60,   # refills: 0  Last Office Visit : 6/8/2023  Future Office visit:  7/1/2024    Routing refill request to provider for review/approval because:  Last written by ER Provider  Needing a new order by Primary Provider  Med not on protocol to fill  Refer to Provider for refills for Pt care    Shoshana Garza RN  Central Triage Red Flags/Med Refills

## 2024-04-08 RX ORDER — LACOSAMIDE 100 MG/1
100 TABLET ORAL 2 TIMES DAILY
Qty: 60 TABLET | Refills: 5 | Status: SHIPPED | OUTPATIENT
Start: 2024-04-08 | End: 2024-05-14

## 2024-05-14 ENCOUNTER — VIRTUAL VISIT (OUTPATIENT)
Dept: NEUROLOGY | Facility: CLINIC | Age: 64
End: 2024-05-14
Payer: MEDICARE

## 2024-05-14 VITALS — HEIGHT: 67 IN | BODY MASS INDEX: 28.25 KG/M2 | WEIGHT: 180 LBS

## 2024-05-14 DIAGNOSIS — G40.909 SEIZURE DISORDER (H): ICD-10-CM

## 2024-05-14 DIAGNOSIS — R56.9 CONVULSIONS, UNSPECIFIED CONVULSION TYPE (H): ICD-10-CM

## 2024-05-14 DIAGNOSIS — R56.9 SEIZURES (H): ICD-10-CM

## 2024-05-14 RX ORDER — LAMOTRIGINE 100 MG/1
TABLET ORAL
Qty: 630 TABLET | Refills: 3 | Status: SHIPPED | OUTPATIENT
Start: 2024-05-14

## 2024-05-14 RX ORDER — LACOSAMIDE 100 MG/1
100 TABLET ORAL 2 TIMES DAILY
Qty: 180 TABLET | Refills: 1 | Status: SHIPPED | OUTPATIENT
Start: 2024-05-14

## 2024-05-14 RX ORDER — ZONISAMIDE 100 MG/1
CAPSULE ORAL
Qty: 270 CAPSULE | Refills: 3 | Status: SHIPPED | OUTPATIENT
Start: 2024-05-14

## 2024-05-14 ASSESSMENT — PAIN SCALES - GENERAL: PAINLEVEL: NO PAIN (0)

## 2024-05-14 NOTE — PROGRESS NOTES
Virtual Visit Details  Consented to virtual visit  Type of service:  Video Visit   Video Start Time: 11:41 AM  Video End Time:12:01 PM    Originating Location (pt. Location): In [place]    Distant Location (provider location):  On-site  Platform used for Video Visit: Click [employer]    [facility] Progress Note      Patient:  Garcia Canada  :  1960   Age:  63 year old   Today's Office Visit:  2024  Chief Complaint: Follow up seizures    Epilepsy Data:  His seizures were described as tonic seizures, with probably LOC, which lasted for 20 sec each.     He was stried on Depakote recently due to increased seizure frequency.  Unfortunately, he experienced significant side effects from Depakote.  He was having significant hallucinations for the past two weeks, and Depakote had to be discontinued.     He had Botox injection for his right sided spasticity.  His spasticity improved.     According to the patient, he had a history of right-sided meningioma, status post resection in 2014. He had an intracerebral hemorrhage over the left side of the brain. He underwent a craniotomy for intraparenchymal hematoma on 2014. He had one seizure at the time of the hemorrhage. The description of the seizure is unclear.   In 2014, he had a repeat CT scan which showed dilatation of the left temporal horn related to entrapment. On 2014, Dr. Sudarshan Plummer performed an endoscopic fenestration of the cyst, and he was found to have seizures in the hospital. The patient reported that he had a total of four seizures since 2014. Three were generalized tonic-clonic seizures. One was probably simple partial seizure. The simple partial seizure was right arm tonic posturing with no loss of consciousness. He remembered those and the generalized tonic-clonic seizures started with right hand tonic posturing and then progressed to generalized tonic-clonic movement. He reported that two of the seizures were  witnessed. One was witnessed in the hospital and one was witnessed by his sister.     History of Present Illness:   Mr. Canada was last seen by Dr. Hirsch on 6/8/2023. At the time of the patient's last visit, his last seizure was in 2020, on 300-400 lamotrigine and zonisamide 300mg at bedtime, with a seizure every five to six weeks before initiating zonisamide. He was to continue his current medication. He was to undergo labs for monitoring. He was to follow up in one year. If he had recurrence of seizures, his lamotrigine or zonisamide could be increased, versus vimpat added.    In the interim, the patient had seizure recurrence. During an emergency department visit March  15,2024, vimpat was added at 100-100 (right sided weakness, presumed seizure). Today's visit was scheduled due to side effects.    Today, Mr. Canada states he has had four seizures since last seen, moreso in the last 2.5 months. He states since adding Vimpat 100-100 he has not had any further seizures. The lamotrigine and zonisamide dosing were reviewed and accurate. He feels he is tolerating all of his medications without side effects.    They deny any clear precipitant with the seizures. The seizures he had were his typical seizures. There were no injuries.      He denies any side effects with his medications.     He states he is sleeping well. He falls asleep easily around 1130PM, and wakes around 5AM. He will go back to bed around 8AM and will sleep until 10AM.  He may take an afternoon nap for 1-2 hours.  He states every time he eats he wants to take a nap.    He states his stress level is ok.  He does get bored a lot.  He takes his medications as prescribed.     We reviewed his MRI of the head. We discussed a neurosurgical evaluation. They wished to review this further with Dr. Hirsch first.    Current Outpatient Medications   Medication Sig Dispense Refill    acetaminophen (TYLENOL) 325 MG tablet Take 2 tablets (650 mg) by mouth every 6  hours as needed for mild pain      atorvastatin (LIPITOR) 10 MG tablet Take 1 tablet (10 mg) by mouth daily      IBUPROFEN PO Take 1-2 tablets by mouth every 6 hours as needed for moderate pain      Lacosamide (VIMPAT) 100 MG TABS tablet Take 1 tablet (100 mg) by mouth 2 times daily 60 tablet 5    lamoTRIgine (LAMICTAL) 100 MG tablet Take 3 tabs (300 mg) in the morning and 4 tabs in the evening. 630 tablet 3    sertraline (ZOLOFT) 100 MG tablet Take 200 mg by mouth       zonisamide (ZONEGRAN) 100 MG capsule Take 3 tabs (300 mg) at hs 270 capsule 3    carvedilol (COREG) 6.25 MG tablet Take 1 tablet (6.25 mg) by mouth 2 times daily (Patient not taking: Reported on 7/7/2022) 60 tablet 0        Medication Notes:  Feels he is tolerating Vimpat now without side effects.       AED Medication Compliance:  compliant most of the time  Using a pill box:  Yes    Diagnostic studies reviewed:  MRI brain wwo 3/15/2024:  Narrative & Impression   EXAM: MR BRAIN W/O and W CONTRAST  LOCATION: [facility]  DATE: 3/15/2024     INDICATION: R facial droop + enlarging cyst on CT  COMPARISON: CT 03/15/2024. 01/04/2017  CONTRAST: gadavist 9 ml  TECHNIQUE: Routine multiplanar multisequence head MRI without and with intravenous contrast.     FINDINGS:  INTRACRANIAL CONTENTS: Postsurgical changes of prior left parietal craniotomy with unchanged gradient susceptibility and encephalomalacia in the posterior left cerebral hemisphere. Additional gliosis present in the right frontal lobe along an old   ventriculostomy tract.   Redemonstrated is an intraventricular mass located posteriorly in the left lateral ventricle just anterior to the splenium of the corpus callosum. The mass demonstrates homogeneous enhancement, measures 1.9 x 2.2 x 1.9 cm, and has increased in size from   2017, likely representing an intraventricular meningioma. 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    No acute or subacute infarct. No acute intracranial hemorrhage, extra-axial collection, or midline shift. Outside of the areas of encephalomalacia there is mild scattered white matter T2/FLAIR signal abnormality which presumably axial chronic small   vessel ischemic change. Mild generalized cerebral atrophy.  Normal position of the cerebellar tonsils.  SELLA: No abnormality accounting for technique.  OSSEOUS STRUCTURES/SOFT TISSUES: Normal marrow signal. The major intracranial vascular flow voids are maintained.   ORBITS: No abnormality accounting for technique.   SINUSES/MASTOIDS: No paranasal sinus mucosal disease. No middle ear or mastoid effusion.                                                                       IMPRESSION:  1.  Enhancing intraventricular mass in the left lateral ventricle is increased in size from 2017 and likely represents an intraventricular meningioma.  2.  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  3.  No superimposed acute intracranial process.  4.  Stable chronic changes including previous left sided craniotomy with stable postsurgical change and encephalomalacia of the left cerebral hemisphere detailed above.     EEG 5/23/2016:  IMPRESSION: This is an abnormal EEG due to the presence of left hemisphere slowing throughout the recording which is consistent with patient's history of left-sided craniotomy. No clinical or electrographic seizures were observed during this recording.      Latest Reference Range & Units 06/27/23 12:58 03/15/24 16:38   Lamotrigine 3.0 - 15.0 ug/mL 10.5 8.8   Zonisamide Level Quant 10 - 40 ug/mL 15 16     Review of Systems:  Lethargy / Tiredness:  No  Nausea / Vomiting:  No  Double Vision:  No  Sleepiness:  No  Depression:  No  Memory Problems:  stable  Poor Balance:  No  Dizziness:  No  Blurred Vision:  No  Skin:  "negative  Respiratory: No shortness of breath  Cardiovascular: negative  Have you experienced a traumatic fall since your last visit: NO  Are these falls related to your seizures: NO    Other Issues:    Is patient safe to drive:  No- he has not driven since his stroke.    Exam:    Ht 5' 7\" (170.2 cm)   Wt 180 lb (81.6 kg)   BMI 28.19 kg/m       Wt Readings from Last 5 Encounters:   05/14/24 180 lb (81.6 kg)   06/08/23 200 lb (90.7 kg)   07/07/22 192 lb (87.1 kg)   07/16/18 183 lb 3.2 oz (83.1 kg)   02/19/18 207 lb (93.9 kg)       General: General examination reveals a well developed male in no acute distress    Neurological Examination:    Mental Status: Alert and awake. Mood and affect were appropriate to situation. Memory appeared intact, not formally tested. Language without dysarthria.  Cranial Nerves:  II-XII grossly intact. Face with slight right sided weakness (baseline).  Musculoskeletal: Neck supple.  Motor: Moves upper extremities spontaneously and against gravity  Coordination:No tremor visualized      Assessment and Plan:   Mr. Canada has a seizure disorder previously controlled on 300-400 lamotrigine and zonisamide 300mg at bedtime. Unfortunately the patient reports four seizures since last seen, moreso in the last 2.5 months, and had lacosamide 100-100 added to his medication regimen on 3/15/2024. He denies any seizures since this has been added, and is tolerating all medications without side effects.    Mr. Canada will continue his current medications unchanged. Refills were provided.    We discussed his MRI results. We discussed the possibility of a neurosurgical consult with the enlarging intraventricular mass in the left lateral ventricle likely representing an intraventricular meningioma. This was very stressful to the patient. They wished to review this further with Dr. Hirsch on July 1.    Seizure safety precautions were reviewed.The patient was advised to maintain proper seizure precautions. " Although the patient does not drive, [place] regulations on driving were reviewed with the patient. The patient clearly understands that she/he is prohibited from operating a motor vehicle within 3 months following any seizure or other episode with sudden unconsciousness or inability to sit up, and that it is required to report most recent seizure to the DMV within 30 days after the event.    Patient was advised to avoid any activities that might lead to self-injury or injury of others, within 3 months following any seizure with impaired awareness or impaired motor control such activities include but are not limited to operating power tools, operating firearms, climbing ladders/trees/exposure to heights from which he might fall. Patient should not operate power tools or heavy machinery and equipment.  Patient was advised not to swim alone.  Patient should not bathe in any form of tub, such as bathtub, jacuzzi, or hot tub unless there is a responsible adult close by to provide assistance in case she has a seizure and drowns. Patient should not work on hot surfaces such stoves, ovens, or with scalding hot water. 6/8       Mr. Canada will follow up with Dr. Hirsch as scheduled. If they have questions or concerns in the meantime they were encouraged to contact the clinic.    Thank you for letting me participate in your patient's care.      As described above, I met with the patient for 20 minutes and during this time counseling was greater than 50% of the visit time.    The longitudinal plan of care for the diagnosis(es)/condition(s) as documented were addressed during this visit. Due to the added complexity in care, we will continue to support Garcia in the subsequent management and with ongoing continuity of care.

## 2024-05-14 NOTE — NURSING NOTE
Is the patient currently in the state of [place]? YES    Visit mode:VIDEO    If the visit is dropped, the patient can be reconnected by: VIDEO VISIT: Text to cell phone:   Telephone Information:   Mobile [phone]    and VIDEO VISIT: Send to e-mail at: [email or web address]    Will anyone else be joining the visit?  Hailee's Friend   (If patient encounters technical issues they should call [phone])    How would you like to obtain your AVS? MyChart    Are changes needed to the allergy or medication list? No    Are refills needed on medications prescribed by this physician? NO    Reason for visit: PONCE VEGAF

## 2024-05-14 NOTE — LETTER
2024       RE: Garcia Canada  : 1960   MRN: [id number]        Dear Colleague,    Thank you for referring your patient, Garcia Canada, to the [facility] EPILEPSY CARE at [facility]. Please see a copy of my visit note below.      [facility] Progress Note      Patient:  Garcia Canada  :  1960   Age:  63 year old   Today's Office Visit:  2024  Chief Complaint: Follow up seizures    Epilepsy Data:  His seizures were described as tonic seizures, with probably LOC, which lasted for 20 sec each.     He was stried on Depakote recently due to increased seizure frequency.  Unfortunately, he experienced significant side effects from Depakote.  He was having significant hallucinations for the past two weeks, and Depakote had to be discontinued.     He had Botox injection for his right sided spasticity.  His spasticity improved.     According to the patient, he had a history of right-sided meningioma, status post resection in 2014. He had an intracerebral hemorrhage over the left side of the brain. He underwent a craniotomy for intraparenchymal hematoma on 2014. He had one seizure at the time of the hemorrhage. The description of the seizure is unclear.   In 2014, he had a repeat CT scan which showed dilatation of the left temporal horn related to entrapment. On 2014, Dr. Sudarshan Plummer performed an endoscopic fenestration of the cyst, and he was found to have seizures in the hospital. The patient reported that he had a total of four seizures since 2014. Three were generalized tonic-clonic seizures. One was probably simple partial seizure. The simple partial seizure was right arm tonic posturing with no loss of consciousness. He remembered those and the generalized tonic-clonic seizures started with right hand tonic posturing and then progressed to generalized tonic-clonic movement. He reported  that two of the seizures were witnessed. One was witnessed in the hospital and one was witnessed by his sister.     History of Present Illness:   Mr. Canada was last seen by Dr. Hirsch on 6/8/2023. At the time of the patient's last visit, his last seizure was in 2020, on 300-400 lamotrigine and zonisamide 300mg at bedtime, with a seizure every five to six weeks before initiating zonisamide. He was to continue his current medication. He was to undergo labs for monitoring. He was to follow up in one year. If he had recurrence of seizures, his lamotrigine or zonisamide could be increased, versus vimpat added.    In the interim, the patient had seizure recurrence. During an emergency department visit March  15,2024, vimpat was added at 100-100 (right sided weakness, presumed seizure). Today's visit was scheduled due to side effects.    Today, Mr. Canada states he has had four seizures since last seen, moreso in the last 2.5 months. He states since adding Vimpat 100-100 he has not had any further seizures. The lamotrigine and zonisamide dosing were reviewed and accurate. He feels he is tolerating all of his medications without side effects.    They deny any clear precipitant with the seizures. The seizures he had were his typical seizures. There were no injuries.      He denies any side effects with his medications.     He states he is sleeping well. He falls asleep easily around 1130PM, and wakes around 5AM. He will go back to bed around 8AM and will sleep until 10AM.  He may take an afternoon nap for 1-2 hours.  He states every time he eats he wants to take a nap.    He states his stress level is ok.  He does get bored a lot.  He takes his medications as prescribed.     We reviewed his MRI of the head. We discussed a neurosurgical evaluation. They wished to review this further with Dr. Hirsch first.    Current Outpatient Medications   Medication Sig Dispense Refill    acetaminophen (TYLENOL) 325 MG tablet Take 2 tablets  (650 mg) by mouth every 6 hours as needed for mild pain      atorvastatin (LIPITOR) 10 MG tablet Take 1 tablet (10 mg) by mouth daily      IBUPROFEN PO Take 1-2 tablets by mouth every 6 hours as needed for moderate pain      Lacosamide (VIMPAT) 100 MG TABS tablet Take 1 tablet (100 mg) by mouth 2 times daily 60 tablet 5    lamoTRIgine (LAMICTAL) 100 MG tablet Take 3 tabs (300 mg) in the morning and 4 tabs in the evening. 630 tablet 3    sertraline (ZOLOFT) 100 MG tablet Take 200 mg by mouth       zonisamide (ZONEGRAN) 100 MG capsule Take 3 tabs (300 mg) at hs 270 capsule 3    carvedilol (COREG) 6.25 MG tablet Take 1 tablet (6.25 mg) by mouth 2 times daily (Patient not taking: Reported on 7/7/2022) 60 tablet 0        Medication Notes:  Feels he is tolerating Vimpat now without side effects.       AED Medication Compliance:  compliant most of the time  Using a pill box:  Yes    Diagnostic studies reviewed:  MRI brain wwo 3/15/2024:  Narrative & Impression   EXAM: MR BRAIN W/O and W CONTRAST  LOCATION: [facility]  DATE: 3/15/2024     INDICATION: R facial droop + enlarging cyst on CT  COMPARISON: CT 03/15/2024. 01/04/2017  CONTRAST: gadavist 9 ml  TECHNIQUE: Routine multiplanar multisequence head MRI without and with intravenous contrast.     FINDINGS:  INTRACRANIAL CONTENTS: Postsurgical changes of prior left parietal craniotomy with unchanged gradient susceptibility and encephalomalacia in the posterior left cerebral hemisphere. Additional gliosis present in the right frontal lobe along an old   ventriculostomy tract.   Redemonstrated is an intraventricular mass located posteriorly in the left lateral ventricle just anterior to the splenium of the corpus callosum. The mass demonstrates homogeneous enhancement, measures 1.9 x 2.2 x 1.9 cm, and has increased in size from   2017, likely representing an intraventricular meningioma. 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    No acute or subacute infarct. No acute intracranial hemorrhage, extra-axial collection, or midline shift. Outside of the areas of encephalomalacia there is mild scattered white matter T2/FLAIR signal abnormality which presumably axial chronic small   vessel ischemic change. Mild generalized cerebral atrophy.  Normal position of the cerebellar tonsils.  SELLA: No abnormality accounting for technique.  OSSEOUS STRUCTURES/SOFT TISSUES: Normal marrow signal. The major intracranial vascular flow voids are maintained.   ORBITS: No abnormality accounting for technique.   SINUSES/MASTOIDS: No paranasal sinus mucosal disease. No middle ear or mastoid effusion.                                                                       IMPRESSION:  1.  Enhancing intraventricular mass in the left lateral ventricle is increased in size from 2017 and likely represents an intraventricular meningioma.  2.  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  3.  No superimposed acute intracranial process.  4.  Stable chronic changes including previous left sided craniotomy with stable postsurgical change and encephalomalacia of the left cerebral hemisphere detailed above.     EEG 5/23/2016:  IMPRESSION: This is an abnormal EEG due to the presence of left hemisphere slowing throughout the recording which is consistent with patient's history of left-sided craniotomy. No clinical or electrographic seizures were observed during this recording.      Latest Reference Range & Units 06/27/23 12:58 03/15/24 16:38   Lamotrigine 3.0 - 15.0 ug/mL 10.5 8.8   Zonisamide Level Quant 10 - 40 ug/mL 15 16     Review of Systems:  Lethargy / Tiredness:  No  Nausea / Vomiting:  No  Double Vision:  No  Sleepiness:  No  Depression:  No  Memory Problems:  stable  Poor Balance:  No  Dizziness:  No  Blurred  "Vision:  No  Skin: negative  Respiratory: No shortness of breath  Cardiovascular: negative  Have you experienced a traumatic fall since your last visit: NO  Are these falls related to your seizures: NO    Other Issues:    Is patient safe to drive:  No- he has not driven since his stroke.    Exam:    Ht 5' 7\" (170.2 cm)   Wt 180 lb (81.6 kg)   BMI 28.19 kg/m       Wt Readings from Last 5 Encounters:   05/14/24 180 lb (81.6 kg)   06/08/23 200 lb (90.7 kg)   07/07/22 192 lb (87.1 kg)   07/16/18 183 lb 3.2 oz (83.1 kg)   02/19/18 207 lb (93.9 kg)       General: General examination reveals a well developed male in no acute distress    Neurological Examination:    Mental Status: Alert and awake. Mood and affect were appropriate to situation. Memory appeared intact, not formally tested. Language without dysarthria.  Cranial Nerves:  II-XII grossly intact. Face with slight right sided weakness (baseline).  Musculoskeletal: Neck supple.  Motor: Moves upper extremities spontaneously and against gravity  Coordination:No tremor visualized      Assessment and Plan:   Mr. Canada has a seizure disorder previously controlled on 300-400 lamotrigine and zonisamide 300mg at bedtime. Unfortunately the patient reports four seizures since last seen, moreso in the last 2.5 months, and had lacosamide 100-100 added to his medication regimen on 3/15/2024. He denies any seizures since this has been added, and is tolerating all medications without side effects.    Mr. Canada will continue his current medications unchanged. Refills were provided.    We discussed his MRI results. We discussed the possibility of a neurosurgical consult with the enlarging intraventricular mass in the left lateral ventricle likely representing an intraventricular meningioma. This was very stressful to the patient. They wished to review this further with Dr. Hirsch on July 1.    Seizure safety precautions were reviewed.The patient was advised to maintain proper " seizure precautions. Although the patient does not drive, [place] regulations on driving were reviewed with the patient. The patient clearly understands that she/he is prohibited from operating a motor vehicle within 3 months following any seizure or other episode with sudden unconsciousness or inability to sit up, and that it is required to report most recent seizure to the DMV within 30 days after the event.    Patient was advised to avoid any activities that might lead to self-injury or injury of others, within 3 months following any seizure with impaired awareness or impaired motor control such activities include but are not limited to operating power tools, operating firearms, climbing ladders/trees/exposure to heights from which he might fall. Patient should not operate power tools or heavy machinery and equipment.  Patient was advised not to swim alone.  Patient should not bathe in any form of tub, such as bathtub, jacuzzi, or hot tub unless there is a responsible adult close by to provide assistance in case she has a seizure and drowns. Patient should not work on hot surfaces such stoves, ovens, or with scalding hot water. 6/8       Mr. Canada will follow up with Dr. Hirsch as scheduled. If they have questions or concerns in the meantime they were encouraged to contact the clinic.    Thank you for letting me participate in your patient's care.      As described above, I met with the patient for 20 minutes and during this time counseling was greater than 50% of the visit time.    The longitudinal plan of care for the diagnosis(es)/condition(s) as documented were addressed during this visit. Due to the added complexity in care, we will continue to support Garcia in the subsequent management and with ongoing continuity of care.          Again, thank you for allowing me to participate in the care of your patient.      Sincerely,    Mayra Lima PA-C

## 2024-05-16 ENCOUNTER — TELEPHONE (OUTPATIENT)
Dept: NEUROLOGY | Facility: CLINIC | Age: 64
End: 2024-05-16

## 2024-05-16 NOTE — PATIENT INSTRUCTIONS
Continue current medications unchanged. Refills were provided    Follow up as scheduled with Dr. Hirsch. Call sooner with questions, concerns, or worsening symptoms.    Seizure safety precautions:  [place] regulations on driving were reviewed with you and you should not drive until you are three months seizure/spell free.You are prohibited from operating a motor vehicle within 3 months following any seizure or other episode with sudden unconsciousness or inability to sit up, and that it is required to report most recent seizure to the DMV within 30 days after the event.    Avoid any activities that might lead to self-injury or injury of others, within 3 months following any seizure with impaired awareness or impaired motor control such activities include but are not limited to operating power tools, operating firearms, climbing ladders/trees/exposure to heights from which he might fall. Do not operate power tools or heavy machinery and equipment.  Do not swim alone, bathe in any form of tub, such as bathtub, jacuzzi, or hot tub unless there is a responsible adult close by to provide assistance in case she has a seizure and drowns. Avoid work on hot surfaces such stoves, ovens, or with scalding hot water.

## 2024-05-16 NOTE — TELEPHONE ENCOUNTER
"Call returned to Maribeth. No consent to communicate. She was able to tell me that patient had a seizure for first time in over a month. Says this is the first one since starting \"L medication\" I believe this is  Lacosamide. Says he was on the toilet when the seizure happened but could not describe any more other than it was mild. She says that patient felt fine after and later that day. She says that patient was frustrated as he thought his seizures were under control since starting Lacosamide. She wanted me to let Mayra and Dr. Hirsch know as an FYI. She had no further questions. Routing to providers  "

## 2024-05-16 NOTE — TELEPHONE ENCOUNTER
What is the concern that needs to be addressed by a nurse?     Maribeth is calling to report that patient had a seizure on 05/15/2024. Patient reports it was mild and he now feels fine. Maribeth states that patient did express frustration because he thought his seizures were under control. Maribeth can be reached at [phone]    May a detailed message be left on voicemail? YES    Date of last office visit: 05/14/2024    Message routed to: MINCEP RN

## 2024-05-17 NOTE — TELEPHONE ENCOUNTER
Reviewed the results of the patient's recent MRI from 3/15/2024 with enlarging suspected intraventricular meningioma at his visit this week. This was visibly stressful for him (as a possible precipitant to the breakthrough event) and they desired to review this further with Dr. Hirsch at his upcoming visit on 7/1. (4 seizures over 2.5 months until lacosmide added on 3/15/2024, tolerating all medications without side effects at visit on 5/14). Would be reasonable to continue to monitor unless further breakthrough events prior to being seen by Dr. Hirsch.  Mayra Lima PA-C

## 2024-07-01 ENCOUNTER — VIRTUAL VISIT (OUTPATIENT)
Dept: NEUROLOGY | Facility: CLINIC | Age: 64
End: 2024-07-01
Payer: MEDICARE

## 2024-07-01 DIAGNOSIS — G40.909 SEIZURE DISORDER (H): Primary | ICD-10-CM

## 2024-07-01 NOTE — PROGRESS NOTES
Is the patient currently in the state of [place]? YES    Visit mode:VIDEO    If the visit is dropped, the patient can be reconnected by: VIDEO VISIT: Text to cell phone: [phone]    Will anyone else be joining the visit? YES: Maribeth ,How would they like to receive their invitation? Text to cell phone: [phone]      How would you like to obtain your AVS? Mail a copy    Are changes needed to the allergy or medication list? NO    Reason for visit: Follow Up

## 2024-07-01 NOTE — PATIENT INSTRUCTIONS
PLAN:   1. Continue Lamictal 300-400 mg and Zonegran 300mg qhs. Lacosamide 100 mg bid  2. Maribeth will look for a surgeon for his meningioma. He would like to wait until his daughter's wedding on Sep. 6th.  3. Return to clinic in 3 months.

## 2024-07-01 NOTE — LETTER
2024       RE: Garcia Canada  : 1960   MRN: [id number]        Dear Colleague,    Thank you for referring your patient, Garcia Canada, to the [facility] at [facility]. Please see a copy of my visit note below.    Is the patient currently in the state of [place]? YES    Visit mode:VIDEO    If the visit is dropped, the patient can be reconnected by: VIDEO VISIT: Text to cell phone: [phone]    Will anyone else be joining the visit? YES: Maribeth ,How would they like to receive their invitation? Text to cell phone: [phone]      How would you like to obtain your AVS? Mail a copy    Are changes needed to the allergy or medication list? NO    Reason for visit: Follow Up         CHIEF COMPLAINT: Seizures.     HISTORY OF PRESENT ILLNESS:  Video call for follow up. His friend Maribeth was also with him with the video. This patient is a 64-year-old right-handed male returns for follow up for seizures. He had a history of right-sided meningioma, status post resection in 2014. He had an intracerebral hemorrhage over the left side of the brain. Since the last visit about one year ago, he had 4-5 seizures. He is now on Lamictal 300-400 and Zonegran 300 at bedtime, Lacosamide 100 mg bid. Recent MRI showed enhancing intraventricular mass in the left lateral ventricle is increased in size     Zonegran helped a lot in the past for many years. He was having seizures once every 5-6 weeks before started on Zonegran.  It seemed that his seizures were controlled much better since started on Zonegran.  Patient is happy with the seizure control. He is compliant with the seizure meds.     His seizures were described as tonic seizures, with probably LOC, which lasted for 20 sec each.     He was stried on Depakote recently due to increased seizure frequency.  Unfortunately, he experienced significant side effects from Depakote.  He was having  significant hallucinations for the past two weeks, and Depakote had to be discontinued.     He had Botox injection for his right sided spasticity.  His spasticity improved.     According to the patient, he had a history of right-sided meningioma, status post resection in 01/2014. He had an intracerebral hemorrhage over the left side of the brain. He underwent a craniotomy for intraparenchymal hematoma on 01/02/2014. He had one seizure at the time of the hemorrhage. The description of the seizure is unclear.   In 11/2014, he had a repeat CT scan which showed dilatation of the left temporal horn related to entrapment. On 11/12/2014, Dr. Sudarshan Plummer performed an endoscopic fenestration of the cyst, and he was found to have seizures in the hospital. The patient reported that he had a total of four seizures since 11/2014. Three were generalized tonic-clonic seizures. One was probably simple partial seizure. The simple partial seizure was right arm tonic posturing with no loss of consciousness. He remembered those and the generalized tonic-clonic seizures started with right hand tonic posturing and then progressed to generalized tonic-clonic movement. He reported that two of the seizures were witnessed. One was witnessed in the hospital and one was witnessed by his sister.      TRIGGERS FOR SEIZURES: Unclear except missing medications.   RISK FACTORS FOR SEIZURES: Left intraparenchymal hemorrhage of the brain in 01/2014. He had no history of head trauma with loss of consciousness. No history of CNS infection. No history of febrile convulsions.   PAST MEDICAL HISTORY: Cerebral intraparenchymal hemorrhage with hematoma, aneurysm basilar tip, right spastic hemiparesis, hypertension, seizures.   PAST SURGICAL HISTORY: Craniotomy with evacuation of hematoma subdural endoscopy, optical tracking system, ventriculostomy.        Current Outpatient Medications   Medication Sig Dispense Refill    acetaminophen (TYLENOL) 325 MG  tablet Take 2 tablets (650 mg) by mouth every 6 hours as needed for mild pain      atorvastatin (LIPITOR) 10 MG tablet Take 1 tablet (10 mg) by mouth daily      carvedilol (COREG) 6.25 MG tablet Take 1 tablet (6.25 mg) by mouth 2 times daily (Patient not taking: Reported on 7/7/2022) 60 tablet 0    IBUPROFEN PO Take 1-2 tablets by mouth every 6 hours as needed for moderate pain      Lacosamide (VIMPAT) 100 MG TABS tablet Take 1 tablet (100 mg) by mouth 2 times daily 180 tablet 1    lamoTRIgine (LAMICTAL) 100 MG tablet Take 3 tabs (300 mg) in the morning and 4 tabs in the evening. 630 tablet 3    sertraline (ZOLOFT) 100 MG tablet Take 200 mg by mouth       zonisamide (ZONEGRAN) 100 MG capsule Take 3 tabs (300 mg) at hs 270 capsule 3        ALLERGIES: No known drug allergy.   FAMILY HISTORY: No family history of seizures.   SOCIAL HISTORY: He is single, living by himself. Smokes one pack every five days cigarettes. No alcohol use. No drug abuse.   REVIEW OF SYSTEMS: Right-sided weakness and spasticity. Anxiety and seizures. The rest of the 10-point review of systems is negative.      PHYSICAL EXAMINATION:     AAO x 3, speech fluent and appropriate. Speech fluent and appropriate. Hearing grossly normal.     PREVIOUS DIAGNOSTIC TESTING: CT of the head on 04/17/2015 showed slight interval decrease in dilated left temporal cyst and the dilated left temporal horn since the prior exam, previous left-sided craniotomy and the white matter changes in the left hemisphere again noted. MRI scan on 10/07/2014 showed trapped left lateral ventricle with cystic dilatation and adjacent vasogenic edema causing mass effect, unchanged. MRI scan on 01/02/2014 showed increased size of a hyperacute to acute intraparenchymal hemorrhage in the left posterior frontoparietal region with hemorrhage extending into an adjacent area of preexisting encephalomalacia as well as the left lateral ventricle. There is new blood extending into the right  lateral ventricle occipital horn and possibly the fourth ventricle. Acute subarachnoid hemorrhage is again seen over the left parietal and the temporal lobe and insula with possible new subarachnoid blood over the cerebellar folia. There are two regions of enhancement along the anteromedial and anterolateral hemorrhage cavity which may represent sites of residual or recurrent tumor. Comparison with prior image would likely be helpful.    MRI brain 3/15/2024:  IMPRESSION:  1.  Enhancing intraventricular mass in the left lateral ventricle is increased in size from 2017 and likely represents an intraventricular meningioma.  2.  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  3.  No superimposed acute intracranial process.  4.  Stable chronic changes including previous left sided craniotomy with stable postsurgical change and encephalomalacia of the left cerebral hemisphere detailed above.    EEG on of 12/01/2014 showed abnormal. There is evidence of focal cerebral dysfunction over the left hemisphere, especially the left posterior hemisphere. This is consistent with focal cerebral dysfunction in this region.    EEG on 01/03/2014 showed abnormal EEG due to the presence of mild diffuse slowing consistent with mild diffuse encephalopathy as well as additional severe slowing over the left temporal parietal head region consistent with the patient's history of subdural hematoma in this region.      Component      Latest Ref Rng & Units 10/17/2016 4/3/2017   Lamotrigine Level       10.1 12.4   Zonisamide Level Quant         17.3         IMPRESSION:   1. Seizures.       Since the last visit about one year ago, he had 4-5 seizures. He is now on Lamictal 300-400 and Zonegran 300 at bedtime, Lacosamide 100 mg bid.      His seizures were described as tonic seizures, with probably LOC, which lasted for 20 sec each.    2. History of intracerebral hemorrhage  status post evacuation of hematoma.   3. Status post endoscopic fenestration of the left temporal horn cyst related to entrapment.   4. Hypertension.   5. Anxiety.   6. Right spastic hemiparesis.  Improved with Botox.    7. Meningioma. Recent MRI showed enhancing intraventricular mass in the left lateral ventricle is increased in size      PLAN:   1. Continue Lamictal 300-400 mg and Zonegran 300mg qhs. Lacosamide 100 mg bid  2. Maribeth will look for a surgeon for his meningioma. He would like to wait until his daughter's wedding on Sep. 6th.  3. Return to clinic in 3 months.  If patient continue to have seizures, options in the future are a. Increase Lamictal or Zonegran, b. Add Vimpat.      The longitudinal plan of care for seizures was addressed during this visit. Due to the added complexity in care, I will continue to support this patient in the subsequent management of this condition and with the ongoing continuity of care of this condition.       25 min total time was spent on the day of this visit.      15 min was spent on face to face time  10 min was spent on preparation of visit to review charts and labs, ordering medications and tests, and documentation of clinical information        Again, thank you for allowing me to participate in the care of your patient.      Sincerely,    Rishi Hirsch MD

## 2024-07-01 NOTE — PROGRESS NOTES
CHIEF COMPLAINT: Seizures.     HISTORY OF PRESENT ILLNESS:  Video call for follow up. His friend Maribeth was also with him with the video. This patient is a 64-year-old right-handed male returns for follow up for seizures. He had a history of right-sided meningioma, status post resection in 01/2014. He had an intracerebral hemorrhage over the left side of the brain. Since the last visit about one year ago, he had 4-5 seizures. He is now on Lamictal 300-400 and Zonegran 300 at bedtime, Lacosamide 100 mg bid. Recent MRI showed enhancing intraventricular mass in the left lateral ventricle is increased in size     Zonegran helped a lot in the past for many years. He was having seizures once every 5-6 weeks before started on Zonegran.  It seemed that his seizures were controlled much better since started on Zonegran.  Patient is happy with the seizure control. He is compliant with the seizure meds.     His seizures were described as tonic seizures, with probably LOC, which lasted for 20 sec each.     He was stried on Depakote recently due to increased seizure frequency.  Unfortunately, he experienced significant side effects from Depakote.  He was having significant hallucinations for the past two weeks, and Depakote had to be discontinued.     He had Botox injection for his right sided spasticity.  His spasticity improved.     According to the patient, he had a history of right-sided meningioma, status post resection in 01/2014. He had an intracerebral hemorrhage over the left side of the brain. He underwent a craniotomy for intraparenchymal hematoma on 01/02/2014. He had one seizure at the time of the hemorrhage. The description of the seizure is unclear.   In 11/2014, he had a repeat CT scan which showed dilatation of the left temporal horn related to entrapment. On 11/12/2014, Dr. Sudarshan Plummer performed an endoscopic fenestration of the cyst, and he was found to have seizures in the hospital. The patient reported  that he had a total of four seizures since 11/2014. Three were generalized tonic-clonic seizures. One was probably simple partial seizure. The simple partial seizure was right arm tonic posturing with no loss of consciousness. He remembered those and the generalized tonic-clonic seizures started with right hand tonic posturing and then progressed to generalized tonic-clonic movement. He reported that two of the seizures were witnessed. One was witnessed in the hospital and one was witnessed by his sister.      TRIGGERS FOR SEIZURES: Unclear except missing medications.   RISK FACTORS FOR SEIZURES: Left intraparenchymal hemorrhage of the brain in 01/2014. He had no history of head trauma with loss of consciousness. No history of CNS infection. No history of febrile convulsions.   PAST MEDICAL HISTORY: Cerebral intraparenchymal hemorrhage with hematoma, aneurysm basilar tip, right spastic hemiparesis, hypertension, seizures.   PAST SURGICAL HISTORY: Craniotomy with evacuation of hematoma subdural endoscopy, optical tracking system, ventriculostomy.        Current Outpatient Medications   Medication Sig Dispense Refill    acetaminophen (TYLENOL) 325 MG tablet Take 2 tablets (650 mg) by mouth every 6 hours as needed for mild pain      atorvastatin (LIPITOR) 10 MG tablet Take 1 tablet (10 mg) by mouth daily      carvedilol (COREG) 6.25 MG tablet Take 1 tablet (6.25 mg) by mouth 2 times daily (Patient not taking: Reported on 7/7/2022) 60 tablet 0    IBUPROFEN PO Take 1-2 tablets by mouth every 6 hours as needed for moderate pain      Lacosamide (VIMPAT) 100 MG TABS tablet Take 1 tablet (100 mg) by mouth 2 times daily 180 tablet 1    lamoTRIgine (LAMICTAL) 100 MG tablet Take 3 tabs (300 mg) in the morning and 4 tabs in the evening. 630 tablet 3    sertraline (ZOLOFT) 100 MG tablet Take 200 mg by mouth       zonisamide (ZONEGRAN) 100 MG capsule Take 3 tabs (300 mg) at hs 270 capsule 3        ALLERGIES: No known drug allergy.    FAMILY HISTORY: No family history of seizures.   SOCIAL HISTORY: He is single, living by himself. Smokes one pack every five days cigarettes. No alcohol use. No drug abuse.   REVIEW OF SYSTEMS: Right-sided weakness and spasticity. Anxiety and seizures. The rest of the 10-point review of systems is negative.      PHYSICAL EXAMINATION:     AAO x 3, speech fluent and appropriate. Speech fluent and appropriate. Hearing grossly normal.     PREVIOUS DIAGNOSTIC TESTING: CT of the head on 04/17/2015 showed slight interval decrease in dilated left temporal cyst and the dilated left temporal horn since the prior exam, previous left-sided craniotomy and the white matter changes in the left hemisphere again noted. MRI scan on 10/07/2014 showed trapped left lateral ventricle with cystic dilatation and adjacent vasogenic edema causing mass effect, unchanged. MRI scan on 01/02/2014 showed increased size of a hyperacute to acute intraparenchymal hemorrhage in the left posterior frontoparietal region with hemorrhage extending into an adjacent area of preexisting encephalomalacia as well as the left lateral ventricle. There is new blood extending into the right lateral ventricle occipital horn and possibly the fourth ventricle. Acute subarachnoid hemorrhage is again seen over the left parietal and the temporal lobe and insula with possible new subarachnoid blood over the cerebellar folia. There are two regions of enhancement along the anteromedial and anterolateral hemorrhage cavity which may represent sites of residual or recurrent tumor. Comparison with prior image would likely be helpful.    MRI brain 3/15/2024:  IMPRESSION:  1.  Enhancing intraventricular mass in the left lateral ventricle is increased in size from 2017 and likely represents an intraventricular meningioma.  2.  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  3.  No superimposed acute  intracranial process.  4.  Stable chronic changes including previous left sided craniotomy with stable postsurgical change and encephalomalacia of the left cerebral hemisphere detailed above.    EEG on of 12/01/2014 showed abnormal. There is evidence of focal cerebral dysfunction over the left hemisphere, especially the left posterior hemisphere. This is consistent with focal cerebral dysfunction in this region.    EEG on 01/03/2014 showed abnormal EEG due to the presence of mild diffuse slowing consistent with mild diffuse encephalopathy as well as additional severe slowing over the left temporal parietal head region consistent with the patient's history of subdural hematoma in this region.      Component      Latest Ref Rng & Units 10/17/2016 4/3/2017   Lamotrigine Level       10.1 12.4   Zonisamide Level Quant         17.3         IMPRESSION:   1. Seizures.       Since the last visit about one year ago, he had 4-5 seizures. He is now on Lamictal 300-400 and Zonegran 300 at bedtime, Lacosamide 100 mg bid.      His seizures were described as tonic seizures, with probably LOC, which lasted for 20 sec each.    2. History of intracerebral hemorrhage status post evacuation of hematoma.   3. Status post endoscopic fenestration of the left temporal horn cyst related to entrapment.   4. Hypertension.   5. Anxiety.   6. Right spastic hemiparesis.  Improved with Botox.    7. Meningioma. Recent MRI showed enhancing intraventricular mass in the left lateral ventricle is increased in size      PLAN:   1. Continue Lamictal 300-400 mg and Zonegran 300mg qhs. Lacosamide 100 mg bid  2. Maribeth will look for a surgeon for his meningioma. He would like to wait until his daughter's wedding on Sep. 6th.  3. Return to clinic in 3 months.  If patient continue to have seizures, options in the future are a. Increase Lamictal or Zonegran, b. Add Vimpat.      The longitudinal plan of care for seizures was addressed during this visit. Due  to the added complexity in care, I will continue to support this patient in the subsequent management of this condition and with the ongoing continuity of care of this condition.       25 min total time was spent on the day of this visit.      15 min was spent on face to face time  10 min was spent on preparation of visit to review charts and labs, ordering medications and tests, and documentation of clinical information

## 2024-09-30 ENCOUNTER — HOSPITAL ENCOUNTER (EMERGENCY)
Facility: CLINIC | Age: 64
Discharge: HOME | End: 2024-09-30
Payer: MEDICARE

## 2024-09-30 ENCOUNTER — APPOINTMENT (OUTPATIENT)
Dept: GENERAL RADIOLOGY | Facility: CLINIC | Age: 64
End: 2024-09-30
Payer: MEDICARE

## 2024-09-30 VITALS
HEIGHT: 68 IN | TEMPERATURE: 98.7 F | OXYGEN SATURATION: 95 % | DIASTOLIC BLOOD PRESSURE: 63 MMHG | RESPIRATION RATE: 16 BRPM | WEIGHT: 200 LBS | BODY MASS INDEX: 30.31 KG/M2 | HEART RATE: 70 BPM | SYSTOLIC BLOOD PRESSURE: 96 MMHG

## 2024-09-30 DIAGNOSIS — S62.610A CLOSED DISPLACED FRACTURE OF PROXIMAL PHALANX OF RIGHT INDEX FINGER, INITIAL ENCOUNTER: ICD-10-CM

## 2024-09-30 PROCEDURE — 73130 X-RAY EXAM OF HAND: CPT | Mod: RT

## 2024-09-30 PROCEDURE — 99283 EMERGENCY DEPT VISIT LOW MDM: CPT | Mod: 57

## 2024-09-30 PROCEDURE — 99284 EMERGENCY DEPT VISIT MOD MDM: CPT | Mod: 25

## 2024-09-30 PROCEDURE — 26720 TREAT FINGER FRACTURE EACH: CPT | Mod: 54

## 2024-09-30 PROCEDURE — 26720 TREAT FINGER FRACTURE EACH: CPT | Mod: F6

## 2024-09-30 ASSESSMENT — ACTIVITIES OF DAILY LIVING (ADL)
ADLS_ACUITY_SCORE: 41

## 2024-09-30 NOTE — DISCHARGE INSTRUCTIONS
Wear the splint until you follow-up with orthopedics.  Do not take the splint off.  Do not get the splint wet.    You should receive a phone call from the orthopedic clinic within the next couple days to schedule a follow-up appointment.    Return to the ER if you develop severe pain, cold/purple fingertips, numbness, or if other concerning symptoms develop.

## 2024-09-30 NOTE — ED PROVIDER NOTES
History     Chief Complaint   Patient presents with    Hand Injury     Awoke with right hand swelling and purple on Saturday. Came to , agreeable to ED visit.        Garcia Canada is a 64 year old male with significant pmhx of hemorrhagic stroke with right hemiparesis, cerebral aneurysm, intraparenchymal hemorrhage, seizure disorder who presents for evaluation of hand injury.  Patient states he woke up on Saturday morning and noted new bruising and swelling to the right index finger.  Since onset the swelling has progressively worsened and the bruising has spread to the third digit and thumb.  He cannot recall any specific injury to this hand.  He denies limited range of motion.  There is some discomfort due to the swelling but no real pain.  He denies associated weakness, numbness, fever, nausea/vomiting.  He is not on any blood thinning medications.    Allergies:  No Known Allergies    Problem List:    Patient Active Problem List    Diagnosis Date Noted    Seizure disorder (H) 02/10/2017     Priority: Medium    History of hemorrhagic stroke with residual hemiparesis (H) 12/22/2014     Priority: Medium    Nontraumatic brain injury 11/21/2014     Priority: Medium     IMO Regulatory Load OCT 2020      Hydrocephalus (H) 11/12/2014     Priority: Medium    Hydrocephalus, acquired (H) 10/20/2014     Priority: Medium    Cerebral aneurysm without rupture 10/20/2014     Priority: Medium    Cerebral infarction (H) 08/29/2014     Priority: Medium    Right hemiparesis (H) 08/29/2014     Priority: Medium     Formatting of this note might be different from the original.   Secondary to CVA      Intraparenchymal hemorrhage of brain (H) 01/01/2014     Priority: Medium        Past Medical History:    Past Medical History:   Diagnosis Date    Aneurysm (H)     Hypertension     Right spastic hemiparesis (H)     Seizures (H)     Traumatic cerebral intraparenchymal hematoma (H)     Unspecified cerebral artery occlusion with  "cerebral infarction        Past Surgical History:    Past Surgical History:   Procedure Laterality Date    CRANIOTOMY, EVACUATE HEMATOMA SUBDURAL, COMBINED  1/2/2014    Procedure: COMBINED CRANIOTOMY, EVACUATE HEMATOMA SUBDURAL;  Left Sided Craniotomy for Hematoma Evacuation;  Surgeon: Sudarshan Plummer MD;  Location: [facility] OR     UGI ENDOSCOPY W PLACEMENT GASTROSTOMY TUBE PERCUT  1/15/2014    Procedure: COMBINED ESOPHAGOSCOPY, GASTROSCOPY, DUODENOSCOPY (EGD), PLACE PERCUTANEOUS ENDOSCOPIC GASTROSTOMY TUBE;  Surgeon: Sree Barboza MD;  Location: [facility] GI    INTUBATE ENDOTRACHEAL  1/2/2014    Procedure: INTUBATE ENDOTRACHEAL;  Pt to go to IR 1 for intubation first, Left Craniotomy for Hematoma Evac, Rivas, Cranioplasty Removal Set,    ;  Surgeon: Provider, Generic Anesthesia;  Location: [facility] OR    OPTICAL TRACKING SYSTEM VENTRICULOSTOMY Left 11/12/2014    Procedure: OPTICAL TRACKING SYSTEM VENTRICULOSTOMY;  Surgeon: Sudarshan Plummer MD;  Location: [facility] OR       Family History:    Family History   Problem Relation Age of Onset    Unknown/Adopted Mother     Unknown/Adopted Father        Social History:  Marital Status:  Single [1]  Social History     Tobacco Use    Smoking status: Every Day     Current packs/day: 1.50     Types: Cigarettes     Passive exposure: Current    Smokeless tobacco: Never   Substance Use Topics    Alcohol use: No    Drug use: No        Medications:    acetaminophen (TYLENOL) 325 MG tablet  atorvastatin (LIPITOR) 10 MG tablet  carvedilol (COREG) 6.25 MG tablet  IBUPROFEN PO  Lacosamide (VIMPAT) 100 MG TABS tablet  lamoTRIgine (LAMICTAL) 100 MG tablet  sertraline (ZOLOFT) 100 MG tablet  zonisamide (ZONEGRAN) 100 MG capsule          Physical Exam   BP: 96/63  Pulse: 70  Temp: 98.7  F (37.1  C)  Resp: 16  Height: 172.7 cm (5' 8\")  Weight: 90.7 kg (200 lb)  SpO2: 95 %      Physical Exam  Vitals and nursing note reviewed.   Constitutional:       General: He is not in acute distress.     " Appearance: He is not ill-appearing, toxic-appearing or diaphoretic.   HENT:      Head: Normocephalic and atraumatic.   Eyes:      Extraocular Movements: Extraocular movements intact.      Pupils: Pupils are equal, round, and reactive to light.   Cardiovascular:      Rate and Rhythm: Normal rate and regular rhythm.      Pulses: Normal pulses.   Pulmonary:      Effort: Pulmonary effort is normal.   Musculoskeletal:         General: Normal range of motion.      Comments: Swelling and bruising of the right second digit, with bruising also involving the third digit and thumb.  No tenderness to palpation of any of the digits or hand.  Flexion of the second digit mildly limited due to degree of swelling.  Extension intact.  Radial pulse 2+, cap refill less than 2 seconds.   Skin:     General: Skin is warm.      Capillary Refill: Capillary refill takes less than 2 seconds.   Neurological:      General: No focal deficit present.      Mental Status: He is alert and oriented to person, place, and time.      Sensory: No sensory deficit.      Motor: No weakness.   Psychiatric:         Mood and Affect: Mood normal.         Behavior: Behavior normal.           ED [facility]    Splint Application    Date/Time: 9/30/2024 4:07 PM    Performed by: Rachel Ortiz PA-C  Authorized by: Rachel Ortiz PA-C    Risks, benefits and alternatives discussed.      PRE-PROCEDURE DETAILS     Sensation:  Normal    PROCEDURE DETAILS     Laterality:  Right    Location:  Finger    Finger:  R index finger    Splint type:  Radial gutter    Supplies:  Ortho-Glass, elastic bandage and cotton padding    POST PROCEDURE DETAILS     Pain:  Unchanged    Sensation:  Unchanged      PROCEDURE    Patient Tolerance:  Patient tolerated the procedure well with no immediate complications                      Results for orders placed or performed during the hospital encounter of 09/30/24 (from the past 24 hour(s))   XR Hand  Right G/E 3 Views    Narrative    HAND RIGHT THREE OR MORE VIEWS September 30, 2024 2:03 PM     HISTORY: Bruising and swelling to the right second and third digits.    COMPARISON: None.      Impression    IMPRESSION:  1. There is a small mildly displaced intra-articular fracture at the  base of the proximal phalanx of the index finger.  2. Moderate osteoarthrosis of the distal radioulnar joint. Severe  osteoarthrosis of the STT joint. Moderate to severe osteoarthrosis of  the first CMC joint. Mild osteoarthrosis of several of the MCP and  interphalangeal joints. Old healed fifth metacarpal fracture.    NADINE BRANTLEY MD         SYSTEM ID:  [id number]       Medications - No data to display    Assessments & Plan (with Medical Decision Making)     I have reviewed the nursing notes.    I have reviewed the findings, diagnosis, plan and need for follow up with the patient.    Medical Decision Making  Garcia Canada is a 64 year old male with significant pmhx of hemorrhagic stroke with right hemiparesis, cerebral aneurysm, intraparenchymal hemorrhage, seizure disorder who presents for evaluation of hand injury.  Differential diagnoses include soft tissue injury, fracture, neurovascular injury, infection.  Vital signs within normal limits.  Patient is normotensive (per review of recent office and home visits his blood pressure is normally in the 90s-100s/60s), afebrile, he is not tachycardic, and he is satting 95% on room air with a regular respiratory rate and effort.    On examination patient is overall well-appearing, alert and oriented, nontoxic.  He has swelling and bruising most notably to the right second digit with involvement of the right third digit and thumb.  There is no tenderness to palpation over any of the digits or hand.  He has full range of motion except for some limited flexion of the right second digit due to degree of swelling.  Radial pulse 2+, cap refill less than 2 seconds.  Considered further  workup for source of swelling and bruising such as infection, but patient has no tenderness to palpation on exam, no fever, no nausea/vomiting, or other systemic symptoms of illness.  The fingers are not warm to touch or erythematous.  X-rays were obtained, and this revealed a mildly displaced proximal phalanx fracture at the base of the second digit.    Findings were discussed with the patient.  Recommended splint placement, and patient was placed in a radial gutter splint.  Neurovascular examination unchanged after splint placement.  Referral placed orthopedics for patient follow-up.  He was instructed that he should receive a phone call within the next couple days to get this scheduled.  We discussed proper splint care.  Recommended elevation, icing at home as needed for pain.  He was instructed to return to the ER if he develops severe pain, cold/purple/numb fingers, or if other concerning symptoms develop.    New Prescriptions    No medications on file       Final diagnoses:   Closed displaced fracture of proximal phalanx of right index finger, initial encounter       Rachel Ortiz PA-C  6/8/2024   [facility] EMERGENCY DEPT     Rachel Ortiz PA-C  09/30/24 [id number]

## 2024-09-30 NOTE — ED PROVIDER NOTES
BP 96/63   Pulse 70   Temp 98.7  F (37.1  C) (Tympanic)   Resp 16   SpO2 95%     Garcia Canada is a 64-year-old male presenting with complaints of worsening right-sided hand swelling and bruising for the last 48 hours.  Patient denies known injury to the extremity.  Given patient's history, I recommended he/she be evaluated in the emergency department.  We discussed that he/she will likely require further testing and/or treatment beyond the capabilities of the current urgent care setting including labs and potential imaging.  Patient expresses understanding of this and agreement with the plan.  I have explained what could happen if they choose to not have the treatment/transfer.        Anahi Jerry PA-C  09/30/24 [id number]

## 2024-09-30 NOTE — ED TRIAGE NOTES
Awoke with right hand swelling and purple on Saturday. Came to , agreeable to ED visit.    Triage Assessment (Adult)       Row Name 09/30/24 1238          Triage Assessment    Airway WDL WDL        Respiratory WDL    Respiratory WDL WDL        Skin Circulation/Temperature WDL    Skin Circulation/Temperature WDL WDL        Cardiac WDL    Cardiac WDL WDL        Peripheral/Neurovascular WDL    Peripheral Neurovascular WDL WDL        Cognitive/Neuro/Behavioral WDL    Cognitive/Neuro/Behavioral WDL WDL

## 2024-11-26 DIAGNOSIS — R56.9 SEIZURES (H): ICD-10-CM

## 2024-11-27 RX ORDER — LACOSAMIDE 100 MG/1
100 TABLET ORAL 2 TIMES DAILY
Qty: 180 TABLET | Refills: 1 | Status: SHIPPED | OUTPATIENT
Start: 2024-11-27

## 2025-01-02 ENCOUNTER — VIRTUAL VISIT (OUTPATIENT)
Dept: NEUROLOGY | Facility: CLINIC | Age: 65
End: 2025-01-02
Payer: MEDICARE

## 2025-01-02 DIAGNOSIS — R56.9 SEIZURES (H): ICD-10-CM

## 2025-01-02 DIAGNOSIS — R56.9 CONVULSIONS, UNSPECIFIED CONVULSION TYPE (H): ICD-10-CM

## 2025-01-02 DIAGNOSIS — G40.909 SEIZURE DISORDER (H): ICD-10-CM

## 2025-01-02 RX ORDER — ZONISAMIDE 100 MG/1
CAPSULE ORAL
Qty: 270 CAPSULE | Refills: 3 | Status: SHIPPED | OUTPATIENT
Start: 2025-01-02

## 2025-01-02 RX ORDER — LAMOTRIGINE 100 MG/1
TABLET ORAL
Qty: 630 TABLET | Refills: 3 | Status: SHIPPED | OUTPATIENT
Start: 2025-01-02

## 2025-01-02 RX ORDER — LACOSAMIDE 100 MG/1
100 TABLET ORAL 2 TIMES DAILY
Qty: 180 TABLET | Refills: 1 | Status: SHIPPED | OUTPATIENT
Start: 2025-01-02

## 2025-01-02 NOTE — PROGRESS NOTES
Is the patient currently in the state of [place]? YES    Visit mode:VIDEO    If the visit is dropped, the patient can be reconnected by: VIDEO VISIT: Text to cell phone: [phone]    Will anyone else be joining the visit? YES: How would they like to receive their invitation? Text to cell phone: [phone]      How would you like to obtain your AVS? Mail a copy    Are changes needed to the allergy or medication list? NO    Reason for visit: Follow Up

## 2025-01-02 NOTE — PROGRESS NOTES
CHIEF COMPLAINT: Seizures.     HISTORY OF PRESENT ILLNESS:  Video call for follow up. His friend Maribeth and his son were also with him with the video. This patient is a 64-year-old right-handed male returns for follow up for seizures. He had a history of right-sided meningioma, status post resection in 01/2014. He had an intracerebral hemorrhage over the left side of the brain. Since the last visit about 6 months ago, he had no seizures. His last seizure was March 15 of 2024. He is now on Lamictal 300-400 and Zonegran 300 at bedtime, Lacosamide 100 mg bid.     MRI brain in Oct. 2024 showed stable brain image.  Impression :   1. Mild enlargement of intraventricular meningioma in the left lateral ventricle. A cystic nonenhancing structure medial to the meningioma may represent cavum vergae.   2. Stable postop changes of left frontal resection cavity with large region of encephalomalacia in the left parietal lobe and left temporal lobe with ex vacuo dilation of the left lateral ventricle.   3. Stable 3 mm basilar tip aneurysm.     Zonegran helped a lot in the past for many years. He was having seizures once every 5-6 weeks before started on Zonegran.  It seemed that his seizures were controlled much better since started on Zonegran.  Patient is happy with the seizure control. He is compliant with the seizure meds.     His seizures were described as tonic seizures, with probably LOC, which lasted for 20 sec each.     He was stried on Depakote recently due to increased seizure frequency.  Unfortunately, he experienced significant side effects from Depakote.  He was having significant hallucinations for the past two weeks, and Depakote had to be discontinued.     He had Botox injection for his right sided spasticity.  His spasticity improved.     According to the patient, he had a history of right-sided meningioma, status post resection in 01/2014. He had an intracerebral hemorrhage over the left side of the brain. He  underwent a craniotomy for intraparenchymal hematoma on 01/02/2014. He had one seizure at the time of the hemorrhage. The description of the seizure is unclear.   In 11/2014, he had a repeat CT scan which showed dilatation of the left temporal horn related to entrapment. On 11/12/2014, Dr. Sudarshan Plummer performed an endoscopic fenestration of the cyst, and he was found to have seizures in the hospital. The patient reported that he had a total of four seizures since 11/2014. Three were generalized tonic-clonic seizures. One was probably simple partial seizure. The simple partial seizure was right arm tonic posturing with no loss of consciousness. He remembered those and the generalized tonic-clonic seizures started with right hand tonic posturing and then progressed to generalized tonic-clonic movement. He reported that two of the seizures were witnessed. One was witnessed in the hospital and one was witnessed by his sister.      TRIGGERS FOR SEIZURES: Unclear except missing medications.   RISK FACTORS FOR SEIZURES: Left intraparenchymal hemorrhage of the brain in 01/2014. He had no history of head trauma with loss of consciousness. No history of CNS infection. No history of febrile convulsions.   PAST MEDICAL HISTORY: Cerebral intraparenchymal hemorrhage with hematoma, aneurysm basilar tip, right spastic hemiparesis, hypertension, seizures.   PAST SURGICAL HISTORY: Craniotomy with evacuation of hematoma subdural endoscopy, optical tracking system, ventriculostomy.        Current Outpatient Medications   Medication Sig Dispense Refill    acetaminophen (TYLENOL) 325 MG tablet Take 2 tablets (650 mg) by mouth every 6 hours as needed for mild pain      atorvastatin (LIPITOR) 10 MG tablet Take 1 tablet (10 mg) by mouth daily      IBUPROFEN PO Take 1-2 tablets by mouth every 6 hours as needed for moderate pain      Lacosamide (VIMPAT) 100 MG TABS tablet Take 1 tablet (100 mg) by mouth 2 times daily. 180 tablet 1     lamoTRIgine (LAMICTAL) 100 MG tablet Take 3 tabs (300 mg) in the morning and 4 tabs in the evening. 630 tablet 3    sertraline (ZOLOFT) 100 MG tablet Take 200 mg by mouth       zonisamide (ZONEGRAN) 100 MG capsule Take 3 tabs (300 mg) at hs 270 capsule 3    carvedilol (COREG) 6.25 MG tablet Take 1 tablet (6.25 mg) by mouth 2 times daily (Patient not taking: Reported on 7/7/2022) 60 tablet 0        ALLERGIES: No known drug allergy.   FAMILY HISTORY: No family history of seizures.   SOCIAL HISTORY: He is single, living by himself. Smokes one pack every five days cigarettes. No alcohol use. No drug abuse.   REVIEW OF SYSTEMS: Right-sided weakness and spasticity. Anxiety and seizures. The rest of the 10-point review of systems is negative.      PHYSICAL EXAMINATION:     AAO x 3, speech fluent and appropriate. Speech fluent and appropriate. Hearing grossly normal.     PREVIOUS DIAGNOSTIC TESTING: CT of the head on 04/17/2015 showed slight interval decrease in dilated left temporal cyst and the dilated left temporal horn since the prior exam, previous left-sided craniotomy and the white matter changes in the left hemisphere again noted. MRI scan on 10/07/2014 showed trapped left lateral ventricle with cystic dilatation and adjacent vasogenic edema causing mass effect, unchanged. MRI scan on 01/02/2014 showed increased size of a hyperacute to acute intraparenchymal hemorrhage in the left posterior frontoparietal region with hemorrhage extending into an adjacent area of preexisting encephalomalacia as well as the left lateral ventricle. There is new blood extending into the right lateral ventricle occipital horn and possibly the fourth ventricle. Acute subarachnoid hemorrhage is again seen over the left parietal and the temporal lobe and insula with possible new subarachnoid blood over the cerebellar folia. There are two regions of enhancement along the anteromedial and anterolateral hemorrhage cavity which may represent  sites of residual or recurrent tumor. Comparison with prior image would likely be helpful.    MRI brain 3/15/2024:  IMPRESSION:  1.  Enhancing intraventricular mass in the left lateral ventricle is increased in size from 2017 and likely represents an intraventricular meningioma.  2.  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  3.  No superimposed acute intracranial process.  4.  Stable chronic changes including previous left sided craniotomy with stable postsurgical change and encephalomalacia of the left cerebral hemisphere detailed above.    EEG on of 12/01/2014 showed abnormal. There is evidence of focal cerebral dysfunction over the left hemisphere, especially the left posterior hemisphere. This is consistent with focal cerebral dysfunction in this region.    EEG on 01/03/2014 showed abnormal EEG due to the presence of mild diffuse slowing consistent with mild diffuse encephalopathy as well as additional severe slowing over the left temporal parietal head region consistent with the patient's history of subdural hematoma in this region.      Component      Latest Ref Rng & Units 10/17/2016 4/3/2017   Lamotrigine Level       10.1 12.4   Zonisamide Level Quant         17.3         IMPRESSION:   1. Seizures.       Since the last visit about 6 months ago, he had no seizures. His last seizure was March 15 of 2024. He is now on Lamictal 300-400 and Zonegran 300 at bedtime, Lacosamide 100 mg bid.     MRI brain in Oct. 2024 showed stable brain image.    His previous seizures were described as tonic seizures, with probably LOC, which lasted for 20 sec each.    2. History of intracerebral hemorrhage status post evacuation of hematoma.   3. Status post endoscopic fenestration of the left temporal horn cyst related to entrapment.   4. Hypertension.   5. Anxiety.   6. Right spastic hemiparesis.  Improved with Botox.    7. Meningioma. Recent MRI  showed enhancing intraventricular mass in the left lateral ventricle is increased in size.He saw his neurosurgeon for his meningioma. He was told that his tumor is growing really slowing. No need for surgery for now. Will followed in one year.      PLAN:   1. Continue Lamictal 300-400 mg and Zonegran 300mg qhs. Lacosamide 100 mg bid  2. Return to clinic in 6 months. Will check labs. If patient continue to have seizures, will increase Vimpat.      The longitudinal plan of care for seizures was addressed during this visit. Due to the added complexity in care, I will continue to support this patient in the subsequent management of this condition and with the ongoing continuity of care of this condition.       20 min total time was spent on the day of this visit.      10 min was spent on face to face time  10 min was spent on preparation of visit to review charts and labs, ordering medications and tests, and documentation of clinical information

## 2025-02-24 ENCOUNTER — APPOINTMENT (OUTPATIENT)
Dept: CT IMAGING | Facility: CLINIC | Age: 65
End: 2025-02-24
Attending: EMERGENCY MEDICINE
Payer: MEDICARE

## 2025-02-24 ENCOUNTER — HOSPITAL ENCOUNTER (EMERGENCY)
Facility: CLINIC | Age: 65
Discharge: HOME | End: 2025-02-24
Attending: EMERGENCY MEDICINE | Admitting: EMERGENCY MEDICINE
Payer: MEDICARE

## 2025-02-24 ENCOUNTER — APPOINTMENT (OUTPATIENT)
Dept: GENERAL RADIOLOGY | Facility: CLINIC | Age: 65
End: 2025-02-24
Attending: FAMILY MEDICINE
Payer: MEDICARE

## 2025-02-24 VITALS
TEMPERATURE: 97.9 F | HEIGHT: 68 IN | RESPIRATION RATE: 16 BRPM | SYSTOLIC BLOOD PRESSURE: 110 MMHG | HEART RATE: 58 BPM | DIASTOLIC BLOOD PRESSURE: 66 MMHG | BODY MASS INDEX: 25.76 KG/M2 | OXYGEN SATURATION: 95 % | WEIGHT: 170 LBS

## 2025-02-24 DIAGNOSIS — G89.29 CHRONIC PAIN OF LEFT KNEE: ICD-10-CM

## 2025-02-24 DIAGNOSIS — M25.562 CHRONIC PAIN OF LEFT KNEE: ICD-10-CM

## 2025-02-24 PROCEDURE — 73562 X-RAY EXAM OF KNEE 3: CPT | Mod: LT

## 2025-02-24 PROCEDURE — 250N000013 HC RX MED GY IP 250 OP 250 PS 637: Performed by: EMERGENCY MEDICINE

## 2025-02-24 PROCEDURE — 99284 EMERGENCY DEPT VISIT MOD MDM: CPT | Performed by: EMERGENCY MEDICINE

## 2025-02-24 PROCEDURE — 99284 EMERGENCY DEPT VISIT MOD MDM: CPT | Mod: 25

## 2025-02-24 PROCEDURE — 73700 CT LOWER EXTREMITY W/O DYE: CPT | Mod: LT

## 2025-02-24 RX ORDER — OXYCODONE AND ACETAMINOPHEN 5; 325 MG/1; MG/1
1 TABLET ORAL ONCE
Status: COMPLETED | OUTPATIENT
Start: 2025-02-24 | End: 2025-02-24

## 2025-02-24 RX ORDER — OXYCODONE AND ACETAMINOPHEN 5; 325 MG/1; MG/1
1 TABLET ORAL EVERY 6 HOURS PRN
Qty: 8 TABLET | Refills: 0 | Status: SHIPPED | OUTPATIENT
Start: 2025-02-24

## 2025-02-24 RX ORDER — LACOSAMIDE 100 MG/1
100 TABLET ORAL 2 TIMES DAILY
Qty: 180 TABLET | OUTPATIENT
Start: 2025-02-24

## 2025-02-24 RX ADMIN — OXYCODONE HYDROCHLORIDE AND ACETAMINOPHEN 1 TABLET: 5; 325 TABLET ORAL at 17:49

## 2025-02-24 ASSESSMENT — ACTIVITIES OF DAILY LIVING (ADL)
ADLS_ACUITY_SCORE: 45

## 2025-02-24 NOTE — ED TRIAGE NOTES
Patient was walking with his son, last Wednesday. Patients left knee gave out. Patient's son was able to lower him to the ground. Patient's daughter got a call from PT today stating he was not able to walk on his left leg. No hx of prior surgeries or injuries.      Triage Assessment (Adult)       Row Name 02/24/25 144          Triage Assessment    Airway WDL WDL        Respiratory WDL    Respiratory WDL WDL        Skin Circulation/Temperature WDL    Skin Circulation/Temperature WDL WDL        Cardiac WDL    Cardiac WDL WDL        Peripheral/Neurovascular WDL    Peripheral Neurovascular WDL WDL        Cognitive/Neuro/Behavioral WDL    Cognitive/Neuro/Behavioral WDL WDL

## 2025-02-25 NOTE — DISCHARGE INSTRUCTIONS
Return if symptoms worsen or new symptoms develop.  Follow-up with primary care physician next available.  Drink plenty of fluids.  If increased knee pain difficulty walking or other symptoms present please return for further evaluation and care.  Take Percocet as needed for severe pain and take ibuprofen and Tylenol as tolerated for less severe pain.  Try heat and a brace as needed.

## 2025-02-25 NOTE — ED PROVIDER NOTES
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  Patient has right-sided weakness and has been favoring his left knee for some time he has difficulty walking at this point because of pain in both knees.  Last Wednesday he was walking with his son his left knee gave out.  Patient did not fall significantly but and the son lowered him to the ground.  He was at physical therapy today and had difficulty walking and therefore was told to come in for further assessment.  Patient has generalized pain to left knee he has not had any new trauma.  There is no erythema or significant swelling noted I just causes him to have difficulty trying to get up out of his wheelchair and get around.  Denies any calf pain has not had any focal numbness weakness of the left side.    Allergies:  No Known Allergies    Problem List:    Patient Active Problem List    Diagnosis Date Noted    Seizure disorder (H) 02/10/2017     Priority: Medium    History of hemorrhagic stroke with residual hemiparesis (H) 12/22/2014     Priority: Medium    Nontraumatic brain injury 11/21/2014     Priority: Medium     IMO Regulatory Load OCT 2020      Hydrocephalus (H) 11/12/2014     Priority: Medium    Hydrocephalus, acquired (H) 10/20/2014     Priority: Medium    Cerebral aneurysm without rupture 10/20/2014     Priority: Medium    Cerebral infarction (H) 08/29/2014     Priority: Medium    Right hemiparesis (H) 08/29/2014     Priority: Medium     Formatting of this note might be different from the original.   Secondary to CVA      Intraparenchymal hemorrhage of brain (H) 01/01/2014     Priority: Medium        Past Medical History:    Past Medical History:   Diagnosis Date    Aneurysm      Hypertension     Right spastic hemiparesis (H)     Seizures (H)     Traumatic cerebral intraparenchymal hematoma (H)     Unspecified cerebral artery occlusion with cerebral infarction        Past Surgical History:    Past Surgical History:   Procedure Laterality Date    CRANIOTOMY, EVACUATE HEMATOMA SUBDURAL, COMBINED  1/2/2014    Procedure: COMBINED CRANIOTOMY, EVACUATE HEMATOMA SUBDURAL;  Left Sided Craniotomy for Hematoma Evacuation;  Surgeon: Sudarshan Plummer MD;  Location: [facility] OR     UGI ENDOSCOPY W PLACEMENT GASTROSTOMY TUBE PERCUT  1/15/2014    Procedure: COMBINED ESOPHAGOSCOPY, GASTROSCOPY, DUODENOSCOPY (EGD), PLACE PERCUTANEOUS ENDOSCOPIC GASTROSTOMY TUBE;  Surgeon: Sree Barboza MD;  Location: [facility] GI    INTUBATE ENDOTRACHEAL  1/2/2014    Procedure: INTUBATE ENDOTRACHEAL;  Pt to go to IR 1 for intubation first, Left Craniotomy for Hematoma Evac, Coleman, Cranioplasty Removal Set,    ;  Surgeon: Provider, Generic Anesthesia;  Location: [facility] OR    OPTICAL TRACKING SYSTEM VENTRICULOSTOMY Left 11/12/2014    Procedure: OPTICAL TRACKING SYSTEM VENTRICULOSTOMY;  Surgeon: Sudarshan Plummer MD;  Location: [facility] OR       Family History:    Family History   Problem Relation Age of Onset    Unknown/Adopted Mother     Unknown/Adopted Father        Social History:  Marital Status:  Single [1]  Social History     Tobacco Use    Smoking status: Every Day     Current packs/day: 1.50     Types: Cigarettes     Passive exposure: Current    Smokeless tobacco: Never   Substance Use Topics    Alcohol use: No    Drug use: No        Medications:    acetaminophen (TYLENOL) 325 MG tablet  atorvastatin (LIPITOR) 10 MG tablet  carvedilol (COREG) 6.25 MG tablet  IBUPROFEN PO  Lacosamide (VIMPAT) 100 MG TABS tablet  lamoTRIgine (LAMICTAL) 100 MG tablet  oxyCODONE-acetaminophen (PERCOCET) 5-325 MG tablet  sertraline (ZOLOFT) 100 MG tablet  zonisamide (ZONEGRAN) 100 MG capsule          Review of Systems  As per HPI  Physical Exam  "  BP: 110/66  Pulse: 58  Temp: 97.9  F (36.6  C)  Resp: 16  Height: 172.7 cm (5' 8\")  Weight: 77.1 kg (170 lb)  SpO2: 95 %      Physical Exam  Vitals and nursing note reviewed.   Constitutional:       General: He is not in acute distress.     Appearance: Normal appearance. He is not ill-appearing, toxic-appearing or diaphoretic.   HENT:      Head: Normocephalic and atraumatic.   Cardiovascular:      Pulses: Normal pulses.   Pulmonary:      Effort: Pulmonary effort is normal.   Musculoskeletal:      Cervical back: Normal range of motion and neck supple.      Comments: There is tenderness to palpation of the anterior portion of the left knee with arthritic changes present along the knee.  No erythema or obvious effusion is noted to the knee.  Pain with flexion extension and internal and external rotation of the knee.  There is no drawer sign present.  No calf tenderness is present pulses are symmetrical sensation is better on left than right leg.   Skin:     General: Skin is warm and dry.   Neurological:      General: No focal deficit present.      Mental Status: He is alert and oriented to person, place, and time.      Sensory: No sensory deficit.      Motor: No weakness.      Coordination: Coordination normal.   Psychiatric:         Mood and Affect: Mood normal.         ED Course        Procedures              Critical Care time:  none     None         Results for orders placed or performed during the hospital encounter of 02/24/25 (from the past 24 hours)   XR Knee Left 3 Views    Narrative    EXAM: XR KNEE LEFT 3 VIEWS  LOCATION: [facility]  DATE: 2/24/2025    INDICATION: Trauma  COMPARISON: None.      Impression    IMPRESSION:     Tricompartmental left knee osteoarthritis, most pronounced in the medial compartment where there is advanced joint space narrowing. There is a joint effusion. Normal alignment. No acute, displaced fracture. An old, healed fracture of the proximal fibular   " shaft is noted.   CT Knee Left w/o Contrast    Narrative    EXAM: CT KNEE LEFT W/O CONTRAST  LOCATION: [facility]  DATE: 2/24/2025    INDICATION: Left knee pain fall last week difficulty ambulating  COMPARISON: MRI 03/27/2014. Radiograph 10/02/2024  TECHNIQUE: Noncontrast. Axial, sagittal and coronal thin-section reconstruction. Dose reduction techniques were used.     FINDINGS:     BONES:  -Diffuse demineralization.  -No acute displaced fracture.  -Severe medial compartment predominant tricompartmental knee arthrosis. There are multiple subchondral cysts, greatest within the medial tibial plateau. There is remodeling of the medial tibial plateau articular surface. Corticated osseous densities   along the medial femoral condyle are similar to knee MRI 03/27/2014.  -Healed proximal fibular shaft fracture.    SOFT TISSUES:  -Moderate knee joint effusion. The effusion appears simple fluid attenuation. No definite blood or fatty components.  -Chronic strain of the distal sartorius. Muscles are otherwise grossly normal.      Impression    IMPRESSION:  1.  Demineralization without acute displaced fracture.  2.  Severe medial compartment predominant tricompartmental knee osteoarthritis with remodeling of the medial tibial plateau articular surface and multiple large subchondral cysts.  3.  Moderate knee joint effusion.  4.  Healed proximal fibular shaft fracture.           Medications   oxyCODONE-acetaminophen (PERCOCET) 5-325 MG per tablet 1 tablet (1 tablet Oral $Given 2/24/25 1600)       Assessments & Plan (with Medical Decision Making) records were reviewed including past medical history medications and allergies.  Virtual visit for seizure disorders with neurology on 1/2/2025 was reviewed.  Office visit for left knee pain on 11/21/2024 with orthopedics was reviewed.  There was recommendation for injection and they have discussed knee replacement.  X-ray of the knee was obtained I  independently reviewed and interpreted this.  This revealed tricompartment left knee osteoarthritis most pronounced in medial compartment with advanced joint space narrowing there is a joint effusion normal alignment no acute displaced fracture hold proximal fibular shaft fracture noted.  Findings discussed with patient he was given Percocet for pain.  Patient having difficulty walking at this point to rule out a occult fracture after his fall event last Wednesday was decided to proceed with a CT scan of the knee.  This was reviewed and agree with radiologist findings of again no obvious acute fractures with significant degenerative changes present.  Patient is scheduled later this week to receive a left knee brace to help with symptoms walking I have also referred him to orthopedics for possible injection and further management.  Patient feels comfortable with this plan at this time I will give him a few pain pills for severe pain and if any increased swelling redness warmth fever or other symptoms present patient should return for recheck.  Patient and dad agree with this plan.     I have reviewed the nursing notes.    I have reviewed the findings, diagnosis, plan and need for follow up with the patient.         Discharge Medication List as of 2/24/2025  7:09 PM        START taking these medications    Details   oxyCODONE-acetaminophen (PERCOCET) 5-325 MG tablet Take 1 tablet by mouth every 6 hours as needed for breakthrough pain., Disp-8 tablet, R-0, E-Prescribe             Final diagnoses:   Chronic pain of left knee       2/24/2025   [facility] EMERGENCY DEPT       Ritesh, Yohan Dozier MD  02/25/25 0830

## 2025-07-14 ENCOUNTER — OFFICE VISIT (OUTPATIENT)
Dept: NEUROLOGY | Facility: CLINIC | Age: 65
End: 2025-07-14
Payer: MEDICARE

## 2025-07-14 VITALS
DIASTOLIC BLOOD PRESSURE: 60 MMHG | BODY MASS INDEX: 28.04 KG/M2 | HEART RATE: 67 BPM | TEMPERATURE: 98.7 F | SYSTOLIC BLOOD PRESSURE: 94 MMHG | HEIGHT: 68 IN | WEIGHT: 185 LBS

## 2025-07-14 DIAGNOSIS — R56.9 CONVULSIONS, UNSPECIFIED CONVULSION TYPE (H): ICD-10-CM

## 2025-07-14 DIAGNOSIS — R56.9 SEIZURES (H): ICD-10-CM

## 2025-07-14 DIAGNOSIS — G40.909 SEIZURE DISORDER (H): ICD-10-CM

## 2025-07-14 PROCEDURE — 80175 DRUG SCREEN QUAN LAMOTRIGINE: CPT | Mod: ORL | Performed by: PSYCHIATRY & NEUROLOGY

## 2025-07-14 PROCEDURE — 80235 DRUG ASSAY LACOSAMIDE: CPT | Mod: ORL | Performed by: PSYCHIATRY & NEUROLOGY

## 2025-07-14 PROCEDURE — 80203 DRUG SCREEN QUANT ZONISAMIDE: CPT | Mod: ORL | Performed by: PSYCHIATRY & NEUROLOGY

## 2025-07-14 RX ORDER — LACOSAMIDE 100 MG/1
100 TABLET ORAL 2 TIMES DAILY
Qty: 180 TABLET | Refills: 3 | Status: SHIPPED | OUTPATIENT
Start: 2025-07-14

## 2025-07-14 RX ORDER — NAPROXEN 500 MG/1
500 TABLET ORAL 2 TIMES DAILY WITH MEALS
COMMUNITY
Start: 2025-01-17

## 2025-07-14 RX ORDER — LAMOTRIGINE 100 MG/1
TABLET ORAL
Qty: 630 TABLET | Refills: 3 | Status: SHIPPED | OUTPATIENT
Start: 2025-07-14

## 2025-07-14 RX ORDER — ZONISAMIDE 100 MG/1
CAPSULE ORAL
Qty: 270 CAPSULE | Refills: 3 | Status: SHIPPED | OUTPATIENT
Start: 2025-07-14

## 2025-07-14 NOTE — PATIENT INSTRUCTIONS
PLAN:   1. Continue Lamictal 300-400 mg and Zonegran 300mg qhs. Lacosamide 100 mg bid  2. Labs: Zonegran, Lacosamide, Lamictal.  3. Return to clinic in 12 months.

## 2025-07-14 NOTE — PROGRESS NOTES
CHIEF COMPLAINT: Seizures.     HISTORY OF PRESENT ILLNESS:  In person follow up. His friend Maribeth was with him with the video. This patient is a 65-year-old right-handed male returns for follow up for seizures. He had a history of right-sided meningioma, status post resection in 01/2014. He had an intracerebral hemorrhage over the left side of the brain.     Since the last visit about 6 months ago, he had no seizures. His last seizure was March 15 of 2024. He is now on Lamictal 300-400 and Zonegran 300 at bedtime, Lacosamide 100 mg bid. He is very happy with the seizure control. He is complaint with the meds, no side effects were reported.      MRI brain in Oct. 2024 showed stable brain image.  Impression :   1. Mild enlargement of intraventricular meningioma in the left lateral ventricle. A cystic nonenhancing structure medial to the meningioma may represent cavum vergae.   2. Stable postop changes of left frontal resection cavity with large region of encephalomalacia in the left parietal lobe and left temporal lobe with ex vacuo dilation of the left lateral ventricle.   3. Stable 3 mm basilar tip aneurysm.     Zonegran helped a lot in the past for many years. He was having seizures once every 5-6 weeks before started on Zonegran.  It seemed that his seizures were controlled much better since started on Zonegran.  Patient is happy with the seizure control. He is compliant with the seizure meds.     His seizures were described as tonic seizures, with probably LOC, which lasted for 20 sec each.     He was stried on Depakote recently due to increased seizure frequency.  Unfortunately, he experienced significant side effects from Depakote.  He was having significant hallucinations for the past two weeks, and Depakote had to be discontinued.     He had Botox injection for his right sided spasticity.  His spasticity improved.     According to the patient, he had a history of right-sided meningioma, status post  resection in 01/2014. He had an intracerebral hemorrhage over the left side of the brain. He underwent a craniotomy for intraparenchymal hematoma on 01/02/2014. He had one seizure at the time of the hemorrhage. The description of the seizure is unclear.   In 11/2014, he had a repeat CT scan which showed dilatation of the left temporal horn related to entrapment. On 11/12/2014, Dr. Sudarshan Plummer performed an endoscopic fenestration of the cyst, and he was found to have seizures in the hospital. The patient reported that he had a total of four seizures since 11/2014. Three were generalized tonic-clonic seizures. One was probably simple partial seizure. The simple partial seizure was right arm tonic posturing with no loss of consciousness. He remembered those and the generalized tonic-clonic seizures started with right hand tonic posturing and then progressed to generalized tonic-clonic movement. He reported that two of the seizures were witnessed. One was witnessed in the hospital and one was witnessed by his sister.      TRIGGERS FOR SEIZURES: Unclear except missing medications.   RISK FACTORS FOR SEIZURES: Left intraparenchymal hemorrhage of the brain in 01/2014. He had no history of head trauma with loss of consciousness. No history of CNS infection. No history of febrile convulsions.   PAST MEDICAL HISTORY: Cerebral intraparenchymal hemorrhage with hematoma, aneurysm basilar tip, right spastic hemiparesis, hypertension, seizures.   PAST SURGICAL HISTORY: Craniotomy with evacuation of hematoma subdural endoscopy, optical tracking system, ventriculostomy.        Current Outpatient Medications   Medication Sig Dispense Refill    acetaminophen (TYLENOL) 325 MG tablet Take 2 tablets (650 mg) by mouth every 6 hours as needed for mild pain      atorvastatin (LIPITOR) 10 MG tablet Take 1 tablet (10 mg) by mouth daily      IBUPROFEN PO Take 1-2 tablets by mouth every 6 hours as needed for moderate pain      Lacosamide  "(VIMPAT) 100 MG TABS tablet Take 1 tablet (100 mg) by mouth 2 times daily. 180 tablet 1    lamoTRIgine (LAMICTAL) 100 MG tablet Take 3 tabs (300 mg) in the morning and 4 tabs in the evening. 630 tablet 3    naproxen (NAPROSYN) 500 MG tablet Take 500 mg by mouth 2 times daily (with meals).      sertraline (ZOLOFT) 100 MG tablet Take 200 mg by mouth       zonisamide (ZONEGRAN) 100 MG capsule Take 3 tabs (300 mg) at hs 270 capsule 3    carvedilol (COREG) 6.25 MG tablet Take 1 tablet (6.25 mg) by mouth 2 times daily (Patient not taking: Reported on 7/14/2025) 60 tablet 0    oxyCODONE-acetaminophen (PERCOCET) 5-325 MG tablet Take 1 tablet by mouth every 6 hours as needed for breakthrough pain. (Patient not taking: Reported on 7/14/2025) 8 tablet 0        ALLERGIES: No known drug allergy.   FAMILY HISTORY: No family history of seizures.   SOCIAL HISTORY: He is single, living by himself. Smokes one pack every five days cigarettes. No alcohol use. No drug abuse.   REVIEW OF SYSTEMS: Right-sided weakness and spasticity. Anxiety and seizures. The rest of the 10-point review of systems is negative.      PHYSICAL EXAMINATION:     Blood pressure 94/60, pulse 67, temperature 98.7  F (37.1  C), temperature source Temporal, height 5' 8\" (172.7 cm), weight 185 lb (83.9 kg).    General exam: General Appearance: No acute distress. HEENT: Normocephalic, atraumatic. Neck: Supple.  Extremities: No edema.     Neurologic Exam: Alert and oriented x3. Speech fluent, appropriate. Normal attention. Cranial Nerves: Pupils are equal, round, reactive to light and accomodation. Extraocular movement intact. No facial weakness or asymmetry. Hearing normal. Motor Exam: Right spastic hemiplegia. Coordination: no ataxia on left side.  Gait and Station: Hemiplegic gait.       PREVIOUS DIAGNOSTIC TESTING: CT of the head on 04/17/2015 showed slight interval decrease in dilated left temporal cyst and the dilated left temporal horn since the prior exam, " previous left-sided craniotomy and the white matter changes in the left hemisphere again noted. MRI scan on 10/07/2014 showed trapped left lateral ventricle with cystic dilatation and adjacent vasogenic edema causing mass effect, unchanged. MRI scan on 01/02/2014 showed increased size of a hyperacute to acute intraparenchymal hemorrhage in the left posterior frontoparietal region with hemorrhage extending into an adjacent area of preexisting encephalomalacia as well as the left lateral ventricle. There is new blood extending into the right lateral ventricle occipital horn and possibly the fourth ventricle. Acute subarachnoid hemorrhage is again seen over the left parietal and the temporal lobe and insula with possible new subarachnoid blood over the cerebellar folia. There are two regions of enhancement along the anteromedial and anterolateral hemorrhage cavity which may represent sites of residual or recurrent tumor. Comparison with prior image would likely be helpful.    MRI brain 3/15/2024:  IMPRESSION:  1.  Enhancing intraventricular mass in the left lateral ventricle is increased in size from 2017 and likely represents an intraventricular meningioma.  2.  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  3.  No superimposed acute intracranial process.  4.  Stable chronic changes including previous left sided craniotomy with stable postsurgical change and encephalomalacia of the left cerebral hemisphere detailed above.    EEG on of 12/01/2014 showed abnormal. There is evidence of focal cerebral dysfunction over the left hemisphere, especially the left posterior hemisphere. This is consistent with focal cerebral dysfunction in this region.    EEG on 01/03/2014 showed abnormal EEG due to the presence of mild diffuse slowing consistent with mild diffuse encephalopathy as well as additional severe slowing over the left temporal parietal head  region consistent with the patient's history of subdural hematoma in this region.      Component      Latest Ref Rng & Units 10/17/2016 4/3/2017   Lamotrigine Level       10.1 12.4   Zonisamide Level Quant         17.3         IMPRESSION:   1. Seizures.       Since the last visit about 6 months ago, he had no seizures. His last seizure was March 15 of 2024. He is now on Lamictal 300-400 and Zonegran 300 at bedtime, Lacosamide 100 mg bid. He is very happy with the seizure control. He is complaint with the meds, no side effects were reported.    MRI brain in Oct. 2024 showed stable brain image.    His previous seizures were described as tonic seizures, with probably LOC, which lasted for 20 sec each.    2. History of intracerebral hemorrhage status post evacuation of hematoma.   3. Status post endoscopic fenestration of the left temporal horn cyst related to entrapment.   4. Hypertension.   5. Anxiety.   6. Right spastic hemiparesis.  Improved with Botox.    7. Meningioma. Recent MRI showed enhancing intraventricular mass in the left lateral ventricle is increased in size.He saw his neurosurgeon for his meningioma. He was told that his tumor is growing really slowing. No need for surgery for now. Will followed in one year.      PLAN:   1. Continue Lamictal 300-400 mg and Zonegran 300mg qhs. Lacosamide 100 mg bid  2. Labs: Zonegran, Lacosamide, Lamictal.  3. Return to clinic in 12 months.  If patient continue to have seizures, will increase Vimpat.      The longitudinal plan of care for seizures was addressed during this visit. Due to the added complexity in care, I will continue to support this patient in the subsequent management of this condition and with the ongoing continuity of care of this condition.       22 min total time was spent on the day of this visit.      16 min was spent on face to face time  6 min was spent on preparation of visit to review charts and labs, ordering medications and tests, and  documentation of clinical information

## 2025-07-14 NOTE — LETTER
2025       RE: Garcia Canada  : 1960   MRN: [id number]      Dear Colleague,    Thank you for referring your patient, Garcia Caanda, to the [facility] at [facility]. Please see a copy of my visit note below.    CHIEF COMPLAINT: Seizures.     HISTORY OF PRESENT ILLNESS:  In person follow up. His friend Maribeth was with him with the video. This patient is a 65-year-old right-handed male returns for follow up for seizures. He had a history of right-sided meningioma, status post resection in 2014. He had an intracerebral hemorrhage over the left side of the brain.     Since the last visit about 6 months ago, he had no seizures. His last seizure was March 15 of 2024. He is now on Lamictal 300-400 and Zonegran 300 at bedtime, Lacosamide 100 mg bid. He is very happy with the seizure control. He is complaint with the meds, no side effects were reported.      MRI brain in Oct. 2024 showed stable brain image.  Impression :   1. Mild enlargement of intraventricular meningioma in the left lateral ventricle. A cystic nonenhancing structure medial to the meningioma may represent cavum vergae.   2. Stable postop changes of left frontal resection cavity with large region of encephalomalacia in the left parietal lobe and left temporal lobe with ex vacuo dilation of the left lateral ventricle.   3. Stable 3 mm basilar tip aneurysm.     Zonegran helped a lot in the past for many years. He was having seizures once every 5-6 weeks before started on Zonegran.  It seemed that his seizures were controlled much better since started on Zonegran.  Patient is happy with the seizure control. He is compliant with the seizure meds.     His seizures were described as tonic seizures, with probably LOC, which lasted for 20 sec each.     He was stried on Depakote recently due to increased seizure frequency.  Unfortunately, he experienced significant side  effects from Depakote.  He was having significant hallucinations for the past two weeks, and Depakote had to be discontinued.     He had Botox injection for his right sided spasticity.  His spasticity improved.     According to the patient, he had a history of right-sided meningioma, status post resection in 01/2014. He had an intracerebral hemorrhage over the left side of the brain. He underwent a craniotomy for intraparenchymal hematoma on 01/02/2014. He had one seizure at the time of the hemorrhage. The description of the seizure is unclear.   In 11/2014, he had a repeat CT scan which showed dilatation of the left temporal horn related to entrapment. On 11/12/2014, Dr. Sudarshan Plummer performed an endoscopic fenestration of the cyst, and he was found to have seizures in the hospital. The patient reported that he had a total of four seizures since 11/2014. Three were generalized tonic-clonic seizures. One was probably simple partial seizure. The simple partial seizure was right arm tonic posturing with no loss of consciousness. He remembered those and the generalized tonic-clonic seizures started with right hand tonic posturing and then progressed to generalized tonic-clonic movement. He reported that two of the seizures were witnessed. One was witnessed in the hospital and one was witnessed by his sister.      TRIGGERS FOR SEIZURES: Unclear except missing medications.   RISK FACTORS FOR SEIZURES: Left intraparenchymal hemorrhage of the brain in 01/2014. He had no history of head trauma with loss of consciousness. No history of CNS infection. No history of febrile convulsions.   PAST MEDICAL HISTORY: Cerebral intraparenchymal hemorrhage with hematoma, aneurysm basilar tip, right spastic hemiparesis, hypertension, seizures.   PAST SURGICAL HISTORY: Craniotomy with evacuation of hematoma subdural endoscopy, optical tracking system, ventriculostomy.        Current Outpatient Medications   Medication Sig Dispense Refill  "    acetaminophen (TYLENOL) 325 MG tablet Take 2 tablets (650 mg) by mouth every 6 hours as needed for mild pain       atorvastatin (LIPITOR) 10 MG tablet Take 1 tablet (10 mg) by mouth daily       IBUPROFEN PO Take 1-2 tablets by mouth every 6 hours as needed for moderate pain       Lacosamide (VIMPAT) 100 MG TABS tablet Take 1 tablet (100 mg) by mouth 2 times daily. 180 tablet 1     lamoTRIgine (LAMICTAL) 100 MG tablet Take 3 tabs (300 mg) in the morning and 4 tabs in the evening. 630 tablet 3     naproxen (NAPROSYN) 500 MG tablet Take 500 mg by mouth 2 times daily (with meals).       sertraline (ZOLOFT) 100 MG tablet Take 200 mg by mouth        zonisamide (ZONEGRAN) 100 MG capsule Take 3 tabs (300 mg) at hs 270 capsule 3     carvedilol (COREG) 6.25 MG tablet Take 1 tablet (6.25 mg) by mouth 2 times daily (Patient not taking: Reported on 7/14/2025) 60 tablet 0     oxyCODONE-acetaminophen (PERCOCET) 5-325 MG tablet Take 1 tablet by mouth every 6 hours as needed for breakthrough pain. (Patient not taking: Reported on 7/14/2025) 8 tablet 0        ALLERGIES: No known drug allergy.   FAMILY HISTORY: No family history of seizures.   SOCIAL HISTORY: He is single, living by himself. Smokes one pack every five days cigarettes. No alcohol use. No drug abuse.   REVIEW OF SYSTEMS: Right-sided weakness and spasticity. Anxiety and seizures. The rest of the 10-point review of systems is negative.      PHYSICAL EXAMINATION:     Blood pressure 94/60, pulse 67, temperature 98.7  F (37.1  C), temperature source Temporal, height 5' 8\" (172.7 cm), weight 185 lb (83.9 kg).    General exam: General Appearance: No acute distress. HEENT: Normocephalic, atraumatic. Neck: Supple.  Extremities: No edema.     Neurologic Exam: Alert and oriented x3. Speech fluent, appropriate. Normal attention. Cranial Nerves: Pupils are equal, round, reactive to light and accomodation. Extraocular movement intact. No facial weakness or asymmetry. Hearing " normal. Motor Exam: Right spastic hemiplegia. Coordination: no ataxia on left side.  Gait and Station: Hemiplegic gait.       PREVIOUS DIAGNOSTIC TESTING: CT of the head on 04/17/2015 showed slight interval decrease in dilated left temporal cyst and the dilated left temporal horn since the prior exam, previous left-sided craniotomy and the white matter changes in the left hemisphere again noted. MRI scan on 10/07/2014 showed trapped left lateral ventricle with cystic dilatation and adjacent vasogenic edema causing mass effect, unchanged. MRI scan on 01/02/2014 showed increased size of a hyperacute to acute intraparenchymal hemorrhage in the left posterior frontoparietal region with hemorrhage extending into an adjacent area of preexisting encephalomalacia as well as the left lateral ventricle. There is new blood extending into the right lateral ventricle occipital horn and possibly the fourth ventricle. Acute subarachnoid hemorrhage is again seen over the left parietal and the temporal lobe and insula with possible new subarachnoid blood over the cerebellar folia. There are two regions of enhancement along the anteromedial and anterolateral hemorrhage cavity which may represent sites of residual or recurrent tumor. Comparison with prior image would likely be helpful.    MRI brain 3/15/2024:  IMPRESSION:  1.  Enhancing intraventricular mass in the left lateral ventricle is increased in size from 2017 and likely represents an intraventricular meningioma.  2.  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  3.  No superimposed acute intracranial process.  4.  Stable chronic changes including previous left sided craniotomy with stable postsurgical change and encephalomalacia of the left cerebral hemisphere detailed above.    EEG on of 12/01/2014 showed abnormal. There is evidence of focal cerebral dysfunction over the left hemisphere, especially  the left posterior hemisphere. This is consistent with focal cerebral dysfunction in this region.    EEG on 01/03/2014 showed abnormal EEG due to the presence of mild diffuse slowing consistent with mild diffuse encephalopathy as well as additional severe slowing over the left temporal parietal head region consistent with the patient's history of subdural hematoma in this region.      Component      Latest Ref Rng & Units 10/17/2016 4/3/2017   Lamotrigine Level       10.1 12.4   Zonisamide Level Quant         17.3         IMPRESSION:   1. Seizures.       Since the last visit about 6 months ago, he had no seizures. His last seizure was March 15 of 2024. He is now on Lamictal 300-400 and Zonegran 300 at bedtime, Lacosamide 100 mg bid. He is very happy with the seizure control. He is complaint with the meds, no side effects were reported.    MRI brain in Oct. 2024 showed stable brain image.    His previous seizures were described as tonic seizures, with probably LOC, which lasted for 20 sec each.    2. History of intracerebral hemorrhage status post evacuation of hematoma.   3. Status post endoscopic fenestration of the left temporal horn cyst related to entrapment.   4. Hypertension.   5. Anxiety.   6. Right spastic hemiparesis.  Improved with Botox.    7. Meningioma. Recent MRI showed enhancing intraventricular mass in the left lateral ventricle is increased in size.He saw his neurosurgeon for his meningioma. He was told that his tumor is growing really slowing. No need for surgery for now. Will followed in one year.      PLAN:   1. Continue Lamictal 300-400 mg and Zonegran 300mg qhs. Lacosamide 100 mg bid  2. Labs: Zonegran, Lacosamide, Lamictal.  3. Return to clinic in 12 months.  If patient continue to have seizures, will increase Vimpat.      The longitudinal plan of care for seizures was addressed during this visit. Due to the added complexity in care, I will continue to support this patient in the subsequent  management of this condition and with the ongoing continuity of care of this condition.       22 min total time was spent on the day of this visit.      16 min was spent on face to face time  6 min was spent on preparation of visit to review charts and labs, ordering medications and tests, and documentation of clinical information      Again, thank you for allowing me to participate in the care of your patient.      Sincerely,    Rishi Hirsch MD

## 2025-07-16 LAB — LAMOTRIGINE SERPL-MCNC: 10.7 UG/ML

## 2025-07-17 LAB
LACOSAMIDE SERPL-MCNC: 5.2 UG/ML
ZONISAMIDE SERPL-MCNC: 15 UG/ML